# Patient Record
Sex: MALE | Race: WHITE | NOT HISPANIC OR LATINO | Employment: OTHER | URBAN - METROPOLITAN AREA
[De-identification: names, ages, dates, MRNs, and addresses within clinical notes are randomized per-mention and may not be internally consistent; named-entity substitution may affect disease eponyms.]

---

## 2018-07-13 ENCOUNTER — OFFICE VISIT (OUTPATIENT)
Dept: OBGYN CLINIC | Facility: CLINIC | Age: 63
End: 2018-07-13
Payer: COMMERCIAL

## 2018-07-13 VITALS
SYSTOLIC BLOOD PRESSURE: 113 MMHG | HEIGHT: 65 IN | BODY MASS INDEX: 28.32 KG/M2 | WEIGHT: 170 LBS | HEART RATE: 73 BPM | DIASTOLIC BLOOD PRESSURE: 68 MMHG

## 2018-07-13 DIAGNOSIS — M75.02 ADHESIVE CAPSULITIS OF LEFT SHOULDER: ICD-10-CM

## 2018-07-13 DIAGNOSIS — M75.112 INCOMPLETE TEAR OF LEFT ROTATOR CUFF: Primary | ICD-10-CM

## 2018-07-13 PROCEDURE — 99204 OFFICE O/P NEW MOD 45 MIN: CPT | Performed by: ORTHOPAEDIC SURGERY

## 2018-07-13 RX ORDER — METFORMIN HYDROCHLORIDE 750 MG/1
750 TABLET, EXTENDED RELEASE ORAL
Refills: 5 | COMMUNITY
Start: 2018-07-08

## 2018-07-13 RX ORDER — LISINOPRIL 10 MG/1
10 TABLET ORAL EVERY EVENING
Refills: 1 | COMMUNITY
Start: 2018-05-12

## 2018-07-13 NOTE — PROGRESS NOTES
Assessment/Plan:  1  Incomplete tear of left rotator cuff  MRI shoulder left wo contrast   2  Adhesive capsulitis of left shoulder  MRI shoulder left wo contrast       Scribe Attestation    I,:   Aggie London am acting as a scribe while in the presence of the attending physician :        I,:   Constantin Price MD personally performed the services described in this documentation    as scribed in my presence :              Monica Tripathi upon examination today demonstrates signs symptoms consistent with a rotator cuff tear  As well as adhesive capsulitis of the left shoulder  Discussed with Monica Tripathi that this is a complicated issue to have  As treatment protocol contradicts shoulder  He did bring an MRI today from 5/17/2016 that demonstrated a high-grade partial-thickness tear of the supraspinatus tendon as well as moderate to severe tenosynovitis of the biceps tendon  That is likely torn as well  Monica Tripathi has failed conservative measures such as physical therapy, PRP injection, as well as a chlorhexidine cleansing  I discussed with Monica Tripathi that would like to have a more current MRI of his left shoulder to determine whether not his rotator cuff tear has progressed  I provided a prescription for this today  I discussed with him at length surgical options for Monica Tripathi as it is complicated of a left shoulder manipulation under anesthesia, with arthroscopy to repair his rotator cuff as well as a possible biceps tenodesis  Additionally I discussed deep recovery  For this procedure as we have to be very cautious to move Monica Tripathi enough to prevent recurrent adhesive capsulitis however cannot move enough to injure the rotator cuff repair  Monica Tripathi understands the risks of the procedure including but not limited to bleeding, infection, recurrent injury, recurrent symptoms, loss of strength, loss range of motion, failure surgery, need for further surgery, and nerve injury    I will see Monica Tripathi back when they have the MRI completed  Subjective:   Trudy Oquendo is a 60 y o  male who presents with left shoulder pain and stiffness  This has been ongoing issue for over 2 years  He states that he denies any trauma to the shoulder  However 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 [employer]  He states that pain is exacerbated with any motion of the shoulder away from the body  The and is better at rest   He has been previously treated with a PRP injection the into the shoulder on 8/22/2016  He states that this did provide him a great deal relief however pain and restriction range of motion did come back after a few months  In mid June 2018 he was treated with a chlorhexidine cleanse for his adhesive capsulitis  He states that this was beneficial as well  However pain and stiffness returned in 2-3 days he was prescribed physical therapy as well upon procedure and that has failed to improve his symptoms  Today he denies any distal paresthesias  However does note a resting tremor  That appears to be a essential tremor  Review of Systems   Constitutional: Negative  HENT: Negative  Eyes: Negative  Respiratory: Negative  Cardiovascular: Negative  Gastrointestinal: Negative  Endocrine: Negative  Genitourinary: Negative  Musculoskeletal: Positive for arthralgias and myalgias  Skin: Negative  Allergic/Immunologic: Negative  Neurological: Positive for tremors  Hematological: Negative  Psychiatric/Behavioral: Negative  Nervous/anxious:           Past Medical History:   Diagnosis Date    Diabetes ([address] )     Hypertension        Past Surgical History:   Procedure Laterality Date    CYST REMOVAL      UNDESCENDED TESTICLE EXPLORATION         No family history on file  Social History     Occupational History    Not on file       Social History Main Topics    Smoking status: Never Smoker    Smokeless tobacco: Never Used    Alcohol use Yes      Comment: rarely    Drug use: No    Sexual activity: Not on file         Current Outpatient Prescriptions:     diclofenac sodium (VOLTAREN) 1 %, , Disp: , Rfl: 0    glucose blood test strip, 1 strip by Nasogastric route, Disp: , Rfl:     lisinopril (ZESTRIL) 10 mg tablet, , Disp: , Rfl: 1    metFORMIN (GLUCOPHAGE-XR) 750 mg 24 hr tablet, , Disp: , Rfl: 5    ONE TOUCH ULTRA TEST test strip, , Disp: , Rfl: 11    Allergies   Allergen Reactions    Other      Cats       Objective:  Vitals:    07/13/18 0937   BP: 113/68   Pulse: 73       Left Shoulder Exam     Tenderness   The patient is experiencing no tenderness  Range of Motion   Left shoulder active abduction: 35    Passive Abduction: 70   Left shoulder forward flexion: 35    External Rotation: 0   Internal Rotation 0 degrees: Sacrum   Internal Rotation 90 degrees: 0     Muscle Strength   Abduction: 3/5   Internal Rotation: 5/5   External Rotation: 4/5   Supraspinatus: 3/5   Subscapularis: 5/5     Tests   Hawkin's test: positive  Impingement: positive    Other   Erythema: absent  Scars: absent  Sensation: normal  Pulse: present     Comments:  Empty can (+)    Sensation is intact    Demonstrates a essential tremor at rest            Physical Exam   Constitutional: He is oriented to person, place, and time  He appears well-developed and well-nourished  HENT:   Head: Normocephalic and atraumatic  Eyes: Conjunctivae are normal    Neck: Normal range of motion  Cardiovascular: Normal rate  Pulmonary/Chest: Effort normal    Musculoskeletal:   As noted in HPI   Neurological: He is alert and oriented to person, place, and time  Skin: Skin is warm and dry  Psychiatric: He has a normal mood and affect  His behavior is normal  Judgment and thought content normal        I have personally reviewed pertinent films in PACS and my interpretation is as follows:    MRI of the left shoulder from 5/17/2016 demonstrates a high-grade partial tear of the supraspinatus    As well as a bone spur at the inferior aspect the humeral head   Moderate severe tenosynovitis of the biceps long head tendon noted    X-rays of the left shoulder from 4/30/2016 demonstrates mild-to-moderate osteoarthritis the glenohumeral joint

## 2018-07-24 ENCOUNTER — OFFICE VISIT (OUTPATIENT)
Dept: OBGYN CLINIC | Facility: CLINIC | Age: 63
End: 2018-07-24
Payer: COMMERCIAL

## 2018-07-24 VITALS
HEART RATE: 66 BPM | WEIGHT: 172.4 LBS | SYSTOLIC BLOOD PRESSURE: 122 MMHG | DIASTOLIC BLOOD PRESSURE: 75 MMHG | HEIGHT: 65 IN | BODY MASS INDEX: 28.72 KG/M2

## 2018-07-24 DIAGNOSIS — M19.019 SHOULDER ARTHRITIS: Primary | ICD-10-CM

## 2018-07-24 PROCEDURE — 99214 OFFICE O/P EST MOD 30 MIN: CPT | Performed by: ORTHOPAEDIC SURGERY

## 2018-07-24 NOTE — PROGRESS NOTES
Assessment/Plan:  1  Shoulder arthritis         Kartik Peña has severe glenohumeral joint arthritis, and has failed extensive conservative treatment thus far  We have discussed today regarding possible total shoulder replacement versus resurfacing hemiarthroplasty  He is a heavy  but does prefer to have a total shoulder replacement at this point  He states that he can curtail his work so that he does not lift as much postoperatively  He is following the procedure  We discussed the procedure and risks at length, including but not limited to, infection, bleeding, wound issues, nerve injury, blood clot, stiffness, failure of procedure, and need for additional surgery  We will see the patient back at the time of surgery  Subjective:   Trudy Oquendo is a 60 y o  male who presents today for follow-up of his left shoulder  He did have his MRI which showed severe glenohumeral arthritis as well as partial undersurface tearing of supraspinatus and interstitial tearing of subscapularis, which are unchanged compared to prior MRI  Biceps tendinosis was also noted without evidence of tear  The patient notes ongoing diffuse pain about the shoulder which can radiate down the arm some at times  This is worse with really any attempts at movement and slightly better with rest   He notes significant limitations in range of motion of the shoulder  He denies any paresthesias of the upper extremity  Review of Systems   Constitutional: Negative for chills, fever and unexpected weight change  HENT: Negative for hearing loss, nosebleeds and sore throat  Eyes: Negative for pain, redness and visual disturbance  Respiratory: Negative for cough, shortness of breath and wheezing  Cardiovascular: Negative for chest pain, palpitations and leg swelling  Gastrointestinal: Negative for abdominal pain, nausea and vomiting  Endocrine: Negative for polyphagia and polyuria  Genitourinary: Negative for dysuria and hematuria  Musculoskeletal:        See HPI   Skin: Negative for rash and wound  Neurological: Negative for dizziness, numbness and headaches  Psychiatric/Behavioral: Negative for decreased concentration and suicidal ideas  The patient is not nervous/anxious  Past Medical History:   Diagnosis Date    Diabetes ([address] )     Hypertension        Past Surgical History:   Procedure Laterality Date    CYST REMOVAL      UNDESCENDED TESTICLE EXPLORATION         History reviewed  No pertinent family history  Social History     Occupational History    Not on file  Social History Main Topics    Smoking status: Never Smoker    Smokeless tobacco: Never Used    Alcohol use Yes      Comment: rarely    Drug use: No    Sexual activity: Not on file         Current Outpatient Prescriptions:     diclofenac sodium (VOLTAREN) 1 %, , Disp: , Rfl: 0    glucose blood test strip, 1 strip by Nasogastric route, Disp: , Rfl:     lisinopril (ZESTRIL) 10 mg tablet, , Disp: , Rfl: 1    metFORMIN (GLUCOPHAGE-XR) 750 mg 24 hr tablet, , Disp: , Rfl: 5    ONE TOUCH ULTRA TEST test strip, , Disp: , Rfl: 11    Allergies   Allergen Reactions    Other      Cats       Objective:  Vitals:    07/24/18 0809   BP: 122/75   Pulse: 66       Left Shoulder Exam     Tenderness   The patient is experiencing no tenderness  Range of Motion   Active Abduction: 20   Forward Flexion: 20   External Rotation: 0   Left shoulder internal rotation 0 degrees: left buttock  Muscle Strength   Internal Rotation: 5/5   External Rotation: 5/5     Other   Sensation: normal  Pulse: present             Physical Exam   Constitutional: He is oriented to person, place, and time  He appears well-developed  HENT:   Head: Normocephalic and atraumatic  Eyes: Conjunctivae are normal    Neck: Neck supple  Cardiovascular: Normal heart sounds and intact distal pulses  Pulmonary/Chest: Effort normal and breath sounds normal    Abdominal: Soft  Neurological: He is alert and oriented to person, place, and time  Skin: Skin is warm and dry  Psychiatric: He has a normal mood and affect  His behavior is normal    Vitals reviewed  I have personally reviewed pertinent films in PACS and my interpretation is as follows:  MRI left shoulder; severe glenohumeral joint arthritis with cystic changes of the humeral head and large inferior osteophyte of the humeral head  Partial undersurface tear supraspinatus and partial interstitial tearing subscapularis, both which are unchanged compared to prior MRI

## 2018-07-31 PROBLEM — M75.112 INCOMPLETE TEAR OF LEFT ROTATOR CUFF: Status: ACTIVE | Noted: 2018-07-31

## 2018-07-31 PROBLEM — M19.019 SHOULDER ARTHRITIS: Status: ACTIVE | Noted: 2018-07-31

## 2018-08-08 ENCOUNTER — TELEPHONE (OUTPATIENT)
Dept: OBGYN CLINIC | Facility: HOSPITAL | Age: 63
End: 2018-08-08

## 2018-08-08 NOTE — TELEPHONE ENCOUNTER
Per conversation with Michelle Davies, I called patient to advise her that a letter would not be accepted due to the rise in forged medical letters  Advised that the patient wear his sling to the airport

## 2018-08-08 NOTE — TELEPHONE ENCOUNTER
Patient sees Dr Ava Ocampo   [phone]      Anahi Seo, patient's wife is asking if there is any way she could  a letter that states that the patient cannot raise his left arm above his head  They are traveling tomorrow & Anahi Seo is worried that it might be an issue     Please let Anahi Seo know when the letter is ready so she can pick it up or if there is a problem taking care of this request

## 2018-08-14 ENCOUNTER — OFFICE VISIT (OUTPATIENT)
Dept: OBGYN CLINIC | Facility: CLINIC | Age: 63
End: 2018-08-14
Payer: COMMERCIAL

## 2018-08-14 VITALS
SYSTOLIC BLOOD PRESSURE: 131 MMHG | WEIGHT: 174.4 LBS | HEART RATE: 79 BPM | BODY MASS INDEX: 29.06 KG/M2 | DIASTOLIC BLOOD PRESSURE: 74 MMHG | HEIGHT: 65 IN

## 2018-08-14 DIAGNOSIS — M70.22 OLECRANON BURSITIS, LEFT ELBOW: Primary | ICD-10-CM

## 2018-08-14 PROCEDURE — 99213 OFFICE O/P EST LOW 20 MIN: CPT | Performed by: ORTHOPAEDIC SURGERY

## 2018-08-14 NOTE — PROGRESS NOTES
Assessment/Plan:  1  Olecranon bursitis, left elbow         Scribe Attestation    I,:   Ysabel Powersmatcarla am acting as a scribe while in the presence of the attending physician :        I,:   Libby Trejo MD personally performed the services described in this documentation    as scribed in my presence :            Carly Beltran has signs and symptoms consistent with olecranon bursitis of his left elbow  I do not appreciate any signs of infection  I believe the swelling in his wrist and hand is due to dependent edema caused by his olecranon bursitis  I explained this to the patient and we discussed signs and symptoms of infection including redness, warmth, fever and chills  I instructed Carly Beltran to follow up with me immediately should he experience any of these issues  I also explained that this injury is quite benign and should resolve on its own  I did caution him to not allow anyone to inject his elbow or attempt to aspirate the swelling as this can cause infection  Carly Beltran can follow up with me as needed for this  Subjective:   Kassidy Carrasco is a 60 y o  male who presents today for evaluation of left elbow, wrist and hand swelling that he first noticed on 8/12/18  He denies pain in his elbow but complains of tightness in his left elbow, wrist and hand, especially with end range elbow flexion and end range gripping  He states that his wife first noticed a bump on the back of his left elbow on 8/12/18 and also noticed that it was warm to the touch  He does not remember an acute injury or trauma but it is possible due to the nature of his work  He denies any paresthesia  He denies any fever or chills  Review of Systems   Constitutional: Negative for chills, fever and unexpected weight change  HENT: Negative for hearing loss, nosebleeds and sore throat  Eyes: Negative for pain, redness and visual disturbance  Respiratory: Negative for cough, shortness of breath and wheezing      Cardiovascular: Negative for chest pain, palpitations and leg swelling  Gastrointestinal: Negative for abdominal pain, nausea and vomiting  Endocrine: Negative for polyphagia and polyuria  Genitourinary: Negative for dysuria and hematuria  Musculoskeletal: Positive for joint swelling  Negative for arthralgias and myalgias  See HPI   Skin: Negative for rash and wound  Neurological: Negative for dizziness, numbness and headaches  Psychiatric/Behavioral: Negative for decreased concentration and suicidal ideas  The patient is not nervous/anxious  Past Medical History:   Diagnosis Date    [address] ([facility] [address]     Hypertension        Past Surgical History:   Procedure Laterality Date    CYST REMOVAL      UNDESCENDED TESTICLE EXPLORATION         History reviewed  No pertinent family history  Social History     Occupational History    Not on file  Social History Main Topics    Smoking status: Never Smoker    Smokeless tobacco: Never Used    Alcohol use Yes      Comment: rarely    Drug use: No    Sexual activity: Not on file         Current Outpatient Prescriptions:     diclofenac sodium (VOLTAREN) 1 %, , Disp: , Rfl: 0    lisinopril (ZESTRIL) 10 mg tablet, , Disp: , Rfl: 1    metFORMIN (GLUCOPHAGE-XR) 750 mg 24 hr tablet, , Disp: , Rfl: 5    ONE TOUCH ULTRA TEST test strip, , Disp: , Rfl: 11    Allergies   Allergen Reactions    Other      Cats       Objective:  Vitals:    08/14/18 1138   BP: 131/74   Pulse: 79       Left Elbow Exam     Tenderness   The patient is experiencing no tenderness  Range of Motion   Extension: 10   Flexion: 130     Tests Varus: negative  Valgus: negative        Other   Erythema: absent  Scars: absent  Sensation: normal  Pulse: present    Comments:  Obvious swelling of the olecranon bursa            Physical Exam   Constitutional: He is oriented to person, place, and time  He appears well-developed  HENT:   Head: Normocephalic and atraumatic     Eyes: Conjunctivae are normal  Neck: Neck supple  Cardiovascular: Intact distal pulses  Pulmonary/Chest: Effort normal    Abdominal: Soft  Neurological: He is alert and oriented to person, place, and time  Skin: Skin is warm and dry  Psychiatric: He has a normal mood and affect  His behavior is normal    Vitals reviewed

## 2018-08-15 DIAGNOSIS — M75.02 ADHESIVE CAPSULITIS OF LEFT SHOULDER: ICD-10-CM

## 2018-08-15 DIAGNOSIS — M75.112 INCOMPLETE TEAR OF LEFT ROTATOR CUFF: Primary | ICD-10-CM

## 2018-08-29 ENCOUNTER — TELEPHONE (OUTPATIENT)
Dept: OBGYN CLINIC | Facility: CLINIC | Age: 63
End: 2018-08-29

## 2018-08-31 ENCOUNTER — TRANSCRIBE ORDERS (OUTPATIENT)
Dept: ADMINISTRATIVE | Facility: HOSPITAL | Age: 63
End: 2018-08-31

## 2018-08-31 ENCOUNTER — APPOINTMENT (OUTPATIENT)
Dept: LAB | Facility: HOSPITAL | Age: 63
DRG: 484 | End: 2018-08-31
Attending: ORTHOPAEDIC SURGERY
Payer: COMMERCIAL

## 2018-08-31 ENCOUNTER — APPOINTMENT (OUTPATIENT)
Dept: PREADMISSION TESTING | Facility: HOSPITAL | Age: 63
DRG: 484 | End: 2018-08-31
Payer: COMMERCIAL

## 2018-08-31 DIAGNOSIS — Z01.818 PREOP TESTING: Primary | ICD-10-CM

## 2018-08-31 DIAGNOSIS — Z01.818 PREOP TESTING: ICD-10-CM

## 2018-08-31 LAB
ABO GROUP BLD: NORMAL
BLD GP AB SCN SERPL QL: NEGATIVE
RH BLD: POSITIVE
SPECIMEN EXPIRATION DATE: NORMAL

## 2018-08-31 PROCEDURE — 36415 COLL VENOUS BLD VENIPUNCTURE: CPT

## 2018-08-31 PROCEDURE — 86850 RBC ANTIBODY SCREEN: CPT

## 2018-08-31 PROCEDURE — 86901 BLOOD TYPING SEROLOGIC RH(D): CPT

## 2018-08-31 PROCEDURE — 86900 BLOOD TYPING SEROLOGIC ABO: CPT

## 2018-08-31 PROCEDURE — 87081 CULTURE SCREEN ONLY: CPT

## 2018-08-31 NOTE — PRE-PROCEDURE INSTRUCTIONS
My Surgical Experience    The following information was developed to assist you to prepare for your operation  What do I need to do before coming to the hospital?   Arrange for a responsible person to drive you to and from the hospital    Arrange care for your children at home  Children are not allowed in the recovery areas of the hospital   Plan to wear clothing that is easy to put on and take off  If you are having shoulder surgery, wear a shirt that buttons or zippers in the front  Bathing  o Shower the evening before and the morning of your surgery with an antibacterial soap  Please refer to the Pre Op Showering Instructions for Surgery Patients Sheet   o Remove nail polish and all body piercing jewelry  o Do not shave any body part for at least 24 hours before surgery-this includes face, arms, legs and upper body  Food  o Nothing to eat or drink after midnight the night before your surgery  This includes candy and chewing gum  o Exception: If your surgery is after 12:00pm (noon), you may have clear liquids such as 7-Up®, ginger ale, apple or cranberry juice, Jell-O®, water, or clear broth until 8:00 am  o Do not drink milk or juice with pulp on the morning before surgery  o Do not drink alcohol 24 hours before surgery  Medicine  o Follow instructions you received from your surgeon about which medicines you may take on the day of surgery  o If instructed to take medicine on the morning of surgery, take pills with just a small sip of water  Call your prescribing doctor for specific infroamtion on what to do if you take insulin    What should I bring to the hospital?    Bring:  Aarti Martin or a walker, if you have them, for foot or knee surgery   A list of the daily medicines, vitamins, minerals, herbals and nutritional supplements you take   Include the dosages of medicines and the time you take them each day   Glasses, dentures or hearing aids   Minimal clothing; you will be wearing hospital sleepwear   Photo ID; required to verify your identity   If you have a Living Will or Power of , bring a copy of the documents   If you have an ostomy, bring an extra pouch and any supplies you use    Do not bring   Medicines or inhalers   Money, valuables or jewelry    What other information should I know about the day of surgery?  Notify your surgeons if you develop a cold, sore throat, cough, fever, rash or any other illness   Report to the Ambulatory Surgical/Same Day Surgery Unit   You will be instructed to stop at Registration only if you have not been pre-registered   Inform your  fi they do not stay that they will be asked by the staff to leave a phone number where they can be reached   Be available to be reached before surgery  In the event the operating room schedule changes, you may be asked to come in earlier or later than expected    *It is important to tell your doctor and others involved in your health care if you are taking or have been taking any non-prescription drugs, vitamins, minerals, herbals or other nutritional supplements  Any of these may interact with some food or medicines and cause a reaction      Pre-Surgery Instructions:   Medication Instructions    diclofenac sodium (VOLTAREN) 1 % Instructed patient per Anesthesia Guidelines   lisinopril (ZESTRIL) 10 mg tablet Instructed patient per Anesthesia Guidelines   metFORMIN (GLUCOPHAGE-XR) 750 mg 24 hr tablet Instructed patient per Anesthesia Guidelines   ONE TOUCH ULTRA TEST test strip Instructed patient per Anesthesia Guidelines

## 2018-09-01 LAB — MRSA NOSE QL CULT: NORMAL

## 2018-09-06 ENCOUNTER — ANESTHESIA EVENT (OUTPATIENT)
Dept: PERIOP | Facility: HOSPITAL | Age: 63
DRG: 484 | End: 2018-09-06
Payer: COMMERCIAL

## 2018-09-06 ENCOUNTER — APPOINTMENT (INPATIENT)
Dept: RADIOLOGY | Facility: HOSPITAL | Age: 63
DRG: 484 | End: 2018-09-06
Payer: COMMERCIAL

## 2018-09-06 ENCOUNTER — ANESTHESIA (OUTPATIENT)
Dept: PERIOP | Facility: HOSPITAL | Age: 63
DRG: 484 | End: 2018-09-06
Payer: COMMERCIAL

## 2018-09-06 ENCOUNTER — HOSPITAL ENCOUNTER (INPATIENT)
Facility: HOSPITAL | Age: 63
LOS: 1 days | Discharge: HOME | DRG: 484 | End: 2018-09-07
Attending: ORTHOPAEDIC SURGERY | Admitting: ORTHOPAEDIC SURGERY
Payer: COMMERCIAL

## 2018-09-06 LAB
ABO GROUP BLD: NORMAL
GLUCOSE SERPL-MCNC: 130 MG/DL (ref 65–140)
RH BLD: POSITIVE

## 2018-09-06 PROCEDURE — 23472 RECONSTRUCT SHOULDER JOINT: CPT | Performed by: ORTHOPAEDIC SURGERY

## 2018-09-06 PROCEDURE — C1776 JOINT DEVICE (IMPLANTABLE): HCPCS | Performed by: ORTHOPAEDIC SURGERY

## 2018-09-06 PROCEDURE — C1713 ANCHOR/SCREW BN/BN,TIS/BN: HCPCS | Performed by: ORTHOPAEDIC SURGERY

## 2018-09-06 PROCEDURE — 23472 RECONSTRUCT SHOULDER JOINT: CPT | Performed by: PHYSICIAN ASSISTANT

## 2018-09-06 PROCEDURE — 0LS40ZZ REPOSITION LEFT UPPER ARM TENDON, OPEN APPROACH: ICD-10-PCS | Performed by: ORTHOPAEDIC SURGERY

## 2018-09-06 PROCEDURE — 82948 REAGENT STRIP/BLOOD GLUCOSE: CPT

## 2018-09-06 PROCEDURE — 73020 X-RAY EXAM OF SHOULDER: CPT

## 2018-09-06 PROCEDURE — 23430 REPAIR BICEPS TENDON: CPT | Performed by: PHYSICIAN ASSISTANT

## 2018-09-06 PROCEDURE — 86900 BLOOD TYPING SEROLOGIC ABO: CPT | Performed by: ORTHOPAEDIC SURGERY

## 2018-09-06 PROCEDURE — 86901 BLOOD TYPING SEROLOGIC RH(D): CPT | Performed by: ORTHOPAEDIC SURGERY

## 2018-09-06 PROCEDURE — 23430 REPAIR BICEPS TENDON: CPT | Performed by: ORTHOPAEDIC SURGERY

## 2018-09-06 PROCEDURE — 0RRK0JZ REPLACEMENT OF LEFT SHOULDER JOINT WITH SYNTHETIC SUBSTITUTE, OPEN APPROACH: ICD-10-PCS | Performed by: ORTHOPAEDIC SURGERY

## 2018-09-06 DEVICE — VERSABOND AB 40 GRAMS FORMULATION 2
Type: IMPLANTABLE DEVICE | Status: FUNCTIONAL
Brand: VERSABOND

## 2018-09-06 DEVICE — GLOBAL ANCHOR PEG GLENOID PREMIERON X-LINKED PE SIZE 44MM
Type: IMPLANTABLE DEVICE | Status: FUNCTIONAL
Brand: GLOBAL

## 2018-09-06 DEVICE — GLOBAL UNITE STANDARD HUMERAL HEAD SIZE 48MM X 18MM
Type: IMPLANTABLE DEVICE | Status: FUNCTIONAL
Brand: GLOBAL UNITE

## 2018-09-06 DEVICE — GLOBAL UNITE ANATOMIC PROXIMAL BODY 135 DEGREE SIZE 14 POROCOAT
Type: IMPLANTABLE DEVICE | Status: FUNCTIONAL
Brand: GLOBAL UNITE

## 2018-09-06 DEVICE — GLOBAL UNITE POROCOAT STANDARD STEM SIZE 14 129MM
Type: IMPLANTABLE DEVICE | Status: FUNCTIONAL
Brand: GLOBAL UNITE

## 2018-09-06 RX ORDER — ASPIRIN 325 MG
325 TABLET ORAL DAILY
Status: DISCONTINUED | OUTPATIENT
Start: 2018-09-07 | End: 2018-09-07 | Stop reason: HOSPADM

## 2018-09-06 RX ORDER — ONDANSETRON 2 MG/ML
4 INJECTION INTRAMUSCULAR; INTRAVENOUS ONCE AS NEEDED
Status: DISCONTINUED | OUTPATIENT
Start: 2018-09-06 | End: 2018-09-06 | Stop reason: HOSPADM

## 2018-09-06 RX ORDER — LABETALOL HYDROCHLORIDE 5 MG/ML
10 INJECTION, SOLUTION INTRAVENOUS AS NEEDED
Status: DISCONTINUED | OUTPATIENT
Start: 2018-09-06 | End: 2018-09-06 | Stop reason: HOSPADM

## 2018-09-06 RX ORDER — ACETAMINOPHEN 325 MG/1
650 TABLET ORAL EVERY 6 HOURS PRN
Status: DISCONTINUED | OUTPATIENT
Start: 2018-09-06 | End: 2018-09-07 | Stop reason: HOSPADM

## 2018-09-06 RX ORDER — PROMETHAZINE HYDROCHLORIDE 25 MG/ML
12.5 INJECTION, SOLUTION INTRAMUSCULAR; INTRAVENOUS ONCE AS NEEDED
Status: DISCONTINUED | OUTPATIENT
Start: 2018-09-06 | End: 2018-09-06 | Stop reason: HOSPADM

## 2018-09-06 RX ORDER — MEPERIDINE HYDROCHLORIDE 25 MG/ML
12.5 INJECTION INTRAMUSCULAR; INTRAVENOUS; SUBCUTANEOUS
Status: DISCONTINUED | OUTPATIENT
Start: 2018-09-06 | End: 2018-09-06 | Stop reason: HOSPADM

## 2018-09-06 RX ORDER — FENTANYL CITRATE 50 UG/ML
INJECTION, SOLUTION INTRAMUSCULAR; INTRAVENOUS AS NEEDED
Status: DISCONTINUED | OUTPATIENT
Start: 2018-09-06 | End: 2018-09-06 | Stop reason: SURG

## 2018-09-06 RX ORDER — SODIUM CHLORIDE, SODIUM LACTATE, POTASSIUM CHLORIDE, CALCIUM CHLORIDE 600; 310; 30; 20 MG/100ML; MG/100ML; MG/100ML; MG/100ML
75 INJECTION, SOLUTION INTRAVENOUS CONTINUOUS
Status: DISCONTINUED | OUTPATIENT
Start: 2018-09-06 | End: 2018-09-06

## 2018-09-06 RX ORDER — FENTANYL CITRATE/PF 50 MCG/ML
50 SYRINGE (ML) INJECTION
Status: DISCONTINUED | OUTPATIENT
Start: 2018-09-06 | End: 2018-09-06 | Stop reason: HOSPADM

## 2018-09-06 RX ORDER — PROPOFOL 10 MG/ML
INJECTION, EMULSION INTRAVENOUS AS NEEDED
Status: DISCONTINUED | OUTPATIENT
Start: 2018-09-06 | End: 2018-09-06 | Stop reason: SURG

## 2018-09-06 RX ORDER — SODIUM CHLORIDE, SODIUM LACTATE, POTASSIUM CHLORIDE, CALCIUM CHLORIDE 600; 310; 30; 20 MG/100ML; MG/100ML; MG/100ML; MG/100ML
125 INJECTION, SOLUTION INTRAVENOUS CONTINUOUS
Status: DISCONTINUED | OUTPATIENT
Start: 2018-09-06 | End: 2018-09-06 | Stop reason: SDUPTHER

## 2018-09-06 RX ORDER — ONDANSETRON 2 MG/ML
4 INJECTION INTRAMUSCULAR; INTRAVENOUS EVERY 6 HOURS PRN
Status: DISCONTINUED | OUTPATIENT
Start: 2018-09-06 | End: 2018-09-07 | Stop reason: HOSPADM

## 2018-09-06 RX ORDER — DOCUSATE SODIUM 100 MG/1
100 CAPSULE, LIQUID FILLED ORAL 2 TIMES DAILY
Status: DISCONTINUED | OUTPATIENT
Start: 2018-09-06 | End: 2018-09-07 | Stop reason: HOSPADM

## 2018-09-06 RX ORDER — METFORMIN HYDROCHLORIDE 500 MG/1
500 TABLET, EXTENDED RELEASE ORAL ONCE
Status: COMPLETED | OUTPATIENT
Start: 2018-09-07 | End: 2018-09-07

## 2018-09-06 RX ORDER — METFORMIN HYDROCHLORIDE 750 MG/1
750 TABLET, EXTENDED RELEASE ORAL
Status: DISCONTINUED | OUTPATIENT
Start: 2018-09-07 | End: 2018-09-06

## 2018-09-06 RX ORDER — LISINOPRIL 10 MG/1
10 TABLET ORAL EVERY EVENING
Status: DISCONTINUED | OUTPATIENT
Start: 2018-09-06 | End: 2018-09-07 | Stop reason: HOSPADM

## 2018-09-06 RX ORDER — OXYCODONE HYDROCHLORIDE AND ACETAMINOPHEN 5; 325 MG/1; MG/1
2 TABLET ORAL EVERY 4 HOURS PRN
Status: DISCONTINUED | OUTPATIENT
Start: 2018-09-06 | End: 2018-09-07

## 2018-09-06 RX ORDER — LIDOCAINE HYDROCHLORIDE 10 MG/ML
INJECTION, SOLUTION INFILTRATION; PERINEURAL AS NEEDED
Status: DISCONTINUED | OUTPATIENT
Start: 2018-09-06 | End: 2018-09-06 | Stop reason: SURG

## 2018-09-06 RX ADMIN — LISINOPRIL 10 MG: 10 TABLET ORAL at 17:33

## 2018-09-06 RX ADMIN — SODIUM CHLORIDE, SODIUM LACTATE, POTASSIUM CHLORIDE, AND CALCIUM CHLORIDE 75 ML/HR: .6; .31; .03; .02 INJECTION, SOLUTION INTRAVENOUS at 17:43

## 2018-09-06 RX ADMIN — CEFAZOLIN SODIUM 2000 MG: 2 SOLUTION INTRAVENOUS at 11:25

## 2018-09-06 RX ADMIN — DOCUSATE SODIUM 100 MG: 100 CAPSULE, LIQUID FILLED ORAL at 17:33

## 2018-09-06 RX ADMIN — LIDOCAINE HYDROCHLORIDE 50 MG: 10 INJECTION, SOLUTION INFILTRATION; PERINEURAL at 11:42

## 2018-09-06 RX ADMIN — SODIUM CHLORIDE, SODIUM LACTATE, POTASSIUM CHLORIDE, AND CALCIUM CHLORIDE 125 ML/HR: .6; .31; .03; .02 INJECTION, SOLUTION INTRAVENOUS at 10:34

## 2018-09-06 RX ADMIN — SODIUM CHLORIDE, SODIUM LACTATE, POTASSIUM CHLORIDE, AND CALCIUM CHLORIDE: .6; .31; .03; .02 INJECTION, SOLUTION INTRAVENOUS at 13:25

## 2018-09-06 RX ADMIN — PROPOFOL 200 MG: 10 INJECTION, EMULSION INTRAVENOUS at 11:42

## 2018-09-06 RX ADMIN — CEFAZOLIN SODIUM 2000 MG: 2 SOLUTION INTRAVENOUS at 11:39

## 2018-09-06 RX ADMIN — FENTANYL CITRATE 50 MCG: 50 INJECTION, SOLUTION INTRAMUSCULAR; INTRAVENOUS at 14:15

## 2018-09-06 RX ADMIN — ROPIVACAINE HYDROCHLORIDE: 2 INJECTION, SOLUTION EPIDURAL; INFILTRATION; PERINEURAL at 14:58

## 2018-09-06 RX ADMIN — FENTANYL CITRATE 50 MCG: 50 INJECTION, SOLUTION INTRAMUSCULAR; INTRAVENOUS at 12:07

## 2018-09-06 RX ADMIN — CEFAZOLIN SODIUM 1000 MG: 1 SOLUTION INTRAVENOUS at 19:58

## 2018-09-06 NOTE — PLAN OF CARE
DISCHARGE PLANNING - CARE MANAGEMENT     Discharge to post-acute care or home with appropriate resources Progressing        MUSCULOSKELETAL - ADULT     Maintain or return mobility to safest level of function Progressing     Maintain proper alignment of affected body part Progressing        SKIN/TISSUE INTEGRITY - ADULT     Skin integrity remains intact Progressing     Incision(s), wounds(s) or drain site(s) healing without S/S of infection Progressing     Oral mucous membranes remain intact Progressing

## 2018-09-06 NOTE — OP NOTE
OPERATIVE REPORT  PATIENT NAME: Prince Mesa    :  1955  MRN: [id number]  Pt Location: [facility] OR ROOM 03    SURGERY DATE: 2018    Surgeon(s) and Role:     * Virgilio Gan MD - Primary     * Taime De Jesus PA-C - Assisting necessary for the procedure for assistance with retraction of vital structures well as assistance in preparation of the proximal humerus and glenoid as well as assistance in implantation of the total shoulder prosthesis    Preop Diagnosis:  Shoulder arthritis [M19 019] left  Incomplete tear of left rotator cuff [M75 112]    Post-Op Diagnosis Codes: * Shoulder arthritis [M19 019] left     * Incomplete tear of left rotator cuff [M75 112]    Procedure(s) (LRB):  TOTAL SHOULDER ARTHROPLASTY (Left) utilizing Depuy Global unite anatomic proximal body 135 degree size 14 Press-Fit and the standard stem for the Global unite size 14 x 129 mm with a standard humeral head size 48 x 18 mm and the Global [place] peg glenoid cross-linked polyethylene size 44 mm cemented and long head of biceps tenodesis  Specimen(s):  * No specimens in log *    Estimated Blood Loss:   300 mL    Drains:       Anesthesia Type:   General w/ Regional    Operative Indications:  Shoulder arthritis [M19 019]  Incomplete tear of left rotator cuff [M75 112]  Audrey Hdez is a 21-year-old male who has suffered for many years with left shoulder pain and had greatly decreased range of motion and function about his left shoulder  He had great difficulty even trying to lift his arm up for putting on clothes and doing activities of daily living  He and his wife understood the risks and benefits of a left total shoulder replacement as he was found on MRI to have an intact rotator cuff  The risks are inclusive of but not limited to infection, stiffness, nerve injury causing numbness pain and weakness, worsening of symptoms, failure to regain full strength and ability, blood clots, and need for further surgery      Operative Findings:  Left shoulder with severe end-stage grade 4 osteoarthritis on both the glenoid and the humeral head  There were numerous osteophytes noted around the humeral head  Range of motion preoperatively was quite limited to only 70° of forward flexion and 60° of abduction passively with external rotation to 10° and internal rotation is 0°  We did have a stable prosthesis at the end of the procedure however achieving forward flexion and abduction each to 140° with external rotation to the 70° and internal rotation to 60°  It was quite stable  Complications:   None    Procedure and Technique:  Jan Deleon was taken to the operating room and placed supine on the OR table  He was given preoperative IV antibiotics  He was given preoperative regional block by Anesthesia  General anesthesia was induced and he was taking comfortably and safely into the semi beach chair position with all parts well padded and the head in neutral position  We took the left shoulder through exam under anesthesia as described above  The left upper extremity was then prepped and draped in the usual sterile fashion  We took a surgical time-out  We then created a deltopectoral incision with a 15 blade  We did carefully dissect down past subcutaneous adipose to the deltopectoral interval and did take the cephalic vein laterally  We did protect the cephalic vein throughout the procedure  We did dissect down to the subscapularis tendon after we dissected past the conjoined tendon  We did tenotomized this also tagged it with Ethibond suture  We then came down upon the long head of the biceps tendon which had obvious high-grade partial tearing  We did tenodese this utilizing Ethibond suture to the soft tissue in the bicipital tendon sheath in the groove  We then dislocated the left shoulder and demonstrated significant osteophytes along the humeral head circumferentially  We did remove these with a rongeur   We then began our preparation of the proximal humerus  We utilized the starter Reamer and then reamed up sequentially to a size 14 which appeared to fit nicely  This was all done by hand with minimal effort  We then placed our cutting jig proximally with a version christa helping us to determine 30° of retroversion for our proximal humerus cut  We then utilized a sagittal saw for this cut  It should be noted that we protected the axillary nerve and did palpate this early in the case  We then placed a proximal humerus protector and exposed the glenoid  We did circumferentially release the labrum and the stump of the long head of biceps tendon utilizing a Bovie  We were then able to utilize the sizing guides for the glenoid and found a size 44 appeared to be the best fit  We did place our centering pin  We then utilized the Reamer on power and then the eccentric   There were some osteophytes that were removed as well in the glenoid utilizing the rongeur  We felt that we had an excellent visualization and preparation of the glenoid  We then drilled the center hole and utilized the guide to drill the 3 pegs  We then irrigated quite thoroughly and trialed the size 44 glenoid  This fit nicely  We then irrigated and cemented in place in the peripheral holes only the real glenoid  We did place graft into the center hole  We then turned our attention back to the proximal humerus and did broach that appropriately  We did place this and 30° of retroversion  We trialed a size 48 x 18 standard humeral head  We felt that we had very good stability with appropriate Shuck and that we had excellent range of motion as described above  We then removed the trial prosthesis and irrigated thoroughly down the humeral canal   We placed the real humeral stem in 30° of retroversion    We had a good fit that appeared to be very similar to the trial   We then trialed 1 more time the humeral head and felt that a size 48 x 18 standard humeral head was appropriate  We then removed the trial and placed the real humeral head and impacted it to engage the [facility] taper  We did range the shoulder once again and achieved excellent range of motion as described above  We had good stability  We then irrigated once again and closed the subscapularis tenotomy utilizing Ethibond suture  We then closed the deltopectoral interval with #1 Vicryl suture followed by 2-0 Vicryl and 4-0 Vicryl and skin glue  It should be noted that we had a very nice and dry field at the end of the procedure  Dry, sterile dressings were applied with a sling  He tolerated the procedure well and transferred to recovery room stable condition  He will follow up be admitted to the hospital as per standard protocol  He will be on the total shoulder replacement rehabilitation protocol     I was present for the entire procedure and A qualified resident physician was not available    Patient Disposition:  PACU     SIGNATURE: Swetha Dasilva MD  DATE: September 6, 2018  TIME: 1:59 PM

## 2018-09-06 NOTE — ANESTHESIA PREPROCEDURE EVALUATION
Review of Systems/Medical History          Cardiovascular   Pulmonary       GI/Hepatic            Endo/Other  Diabetes ,      GYN       Hematology   Musculoskeletal    Arthritis     Neurology   Psychology                   Review of Systems/Medical History  Patient summary reviewed  Chart reviewed      Cardiovascular  Hypertension controlled,    Pulmonary       GI/Hepatic            Endo/Other  Diabetes poorly controlled type 2 Oral agent,   Comment: Hg A1C 7 8    GYN       Hematology   Musculoskeletal    Arthritis     Neurology   Psychology           Physical Exam    Airway    Mallampati score: I  TM Distance: <3 FB  Neck ROM: full     Dental       Cardiovascular  Rhythm: regular, Rate: normal,     Pulmonary  Breath sounds clear to auscultation,     Other Findings        Anesthesia Plan  ASA Score- 3     Anesthesia Type- general and regional with ASA Monitors  Additional Monitors:   Airway Plan:         Plan Factors-    Induction- intravenous  Postoperative Plan-     Informed Consent- Anesthetic plan and risks discussed with patient and spouse

## 2018-09-06 NOTE — PERIOPERATIVE NURSING NOTE
Left arm essential tremor present on admission, left hand also slightly swollen, good radial pulse, brisk capillary refill  Patient reports Dr Kamille Massey aware of the problem

## 2018-09-06 NOTE — ANESTHESIA PROCEDURE NOTES
Peripheral Block    Patient location during procedure: pre-op  Start time: 9/6/2018 11:00 AM  Reason for block: at surgeon's request and post-op pain management  Staffing  Anesthesiologist: [facility]  Preanesthetic Checklist  Completed: patient identified, site marked, surgical consent, pre-op evaluation, timeout performed, IV checked, risks and benefits discussed and monitors and equipment checked  Peripheral Block  Patient position: supine  Prep: ChloraPrep  Patient monitoring: heart rate, continuous pulse ox and frequent blood pressure checks  Block type: interscalene  Laterality: left  Injection technique: catheter  Procedures: ultrasound guided  Local infiltration: ropivacaine  Infiltration strength: 0 2 %  Dose: 20 mL  Needle  Needle type: Stimuplex   Needle length: 10 cm  Needle localization: ultrasound guidance  Test dose: negative  Assessment  Injection assessment: no paresthesia on injection  Paresthesia pain: none  Heart rate change: no  Post-procedure:  sterile dressing applied  patient tolerated the procedure well with no immediate complications

## 2018-09-06 NOTE — H&P (VIEW-ONLY)
Assessment/Plan:  1  Olecranon bursitis, left elbow         Scribe Attestation    I,:   Jacqueline Delarosa am acting as a scribe while in the presence of the attending physician :        I,:   Jazmyn Adhikari MD personally performed the services described in this documentation    as scribed in my presence :            Jeff Gallo has signs and symptoms consistent with olecranon bursitis of his left elbow  I do not appreciate any signs of infection  I believe the swelling in his wrist and hand is due to dependent edema caused by his olecranon bursitis  I explained this to the patient and we discussed signs and symptoms of infection including redness, warmth, fever and chills  I instructed Jeff Gallo to follow up with me immediately should he experience any of these issues  I also explained that this injury is quite benign and should resolve on its own  I did caution him to not allow anyone to inject his elbow or attempt to aspirate the swelling as this can cause infection  Jeff Gallo can follow up with me as needed for this  Subjective:   Ana Maria Grey is a 60 y o  male who presents today for evaluation of left elbow, wrist and hand swelling that he first noticed on 8/12/18  He denies pain in his elbow but complains of tightness in his left elbow, wrist and hand, especially with end range elbow flexion and end range gripping  He states that his wife first noticed a bump on the back of his left elbow on 8/12/18 and also noticed that it was warm to the touch  He does not remember an acute injury or trauma but it is possible due to the nature of his work  He denies any paresthesia  He denies any fever or chills  Review of Systems   Constitutional: Negative for chills, fever and unexpected weight change  HENT: Negative for hearing loss, nosebleeds and sore throat  Eyes: Negative for pain, redness and visual disturbance  Respiratory: Negative for cough, shortness of breath and wheezing      Cardiovascular: Negative for chest pain, palpitations and leg swelling  Gastrointestinal: Negative for abdominal pain, nausea and vomiting  Endocrine: Negative for polyphagia and polyuria  Genitourinary: Negative for dysuria and hematuria  Musculoskeletal: Positive for joint swelling  Negative for arthralgias and myalgias  See HPI   Skin: Negative for rash and wound  Neurological: Negative for dizziness, numbness and headaches  Psychiatric/Behavioral: Negative for decreased concentration and suicidal ideas  The patient is not nervous/anxious  Past Medical History:   Diagnosis Date    Diabetes ([facility] [address] )     Hypertension        Past Surgical History:   Procedure Laterality Date    CYST REMOVAL      UNDESCENDED TESTICLE EXPLORATION         History reviewed  No pertinent family history  Social History     Occupational History    Not on file  Social History Main Topics    Smoking status: Never Smoker    Smokeless tobacco: Never Used    Alcohol use Yes      Comment: rarely    Drug use: No    Sexual activity: Not on file         Current Outpatient Prescriptions:     diclofenac sodium (VOLTAREN) 1 %, , Disp: , Rfl: 0    lisinopril (ZESTRIL) 10 mg tablet, , Disp: , Rfl: 1    metFORMIN (GLUCOPHAGE-XR) 750 mg 24 hr tablet, , Disp: , Rfl: 5    ONE TOUCH ULTRA TEST test strip, , Disp: , Rfl: 11    Allergies   Allergen Reactions    Other      Cats       Objective:  Vitals:    08/14/18 1138   BP: 131/74   Pulse: 79       Left Elbow Exam     Tenderness   The patient is experiencing no tenderness  Range of Motion   Extension: 10   Flexion: 130     Tests Varus: negative  Valgus: negative        Other   Erythema: absent  Scars: absent  Sensation: normal  Pulse: present    Comments:  Obvious swelling of the olecranon bursa            Physical Exam   Constitutional: He is oriented to person, place, and time  He appears well-developed  HENT:   Head: Normocephalic and atraumatic     Eyes: Conjunctivae are normal  Neck: Neck supple  Cardiovascular: Intact distal pulses  Pulmonary/Chest: Effort normal    Abdominal: Soft  Neurological: He is alert and oriented to person, place, and time  Skin: Skin is warm and dry  Psychiatric: He has a normal mood and affect  His behavior is normal    Vitals reviewed

## 2018-09-06 NOTE — ANESTHESIA POSTPROCEDURE EVALUATION
Post-Op Assessment Note      CV Status:  Stable    Mental Status:  Awake    PONV Controlled:  Controlled    Airway Patency:  Patent and adequate    Post Op Vitals Reviewed: Yes          Staff: CRNA       Comments: arousable    Post-op block assessment: catheter intact and no complications        BP (P) 137/76 (09/06/18 1430)    Temp (P) 97 8 °F (36 6 °C) (09/06/18 1430)    Pulse (P) 85 (09/06/18 1430)   Resp (P) 16 (09/06/18 1430)    SpO2

## 2018-09-07 VITALS
WEIGHT: 170 LBS | BODY MASS INDEX: 28.32 KG/M2 | TEMPERATURE: 98.1 F | OXYGEN SATURATION: 94 % | HEART RATE: 82 BPM | DIASTOLIC BLOOD PRESSURE: 58 MMHG | SYSTOLIC BLOOD PRESSURE: 112 MMHG | RESPIRATION RATE: 18 BRPM | HEIGHT: 65 IN

## 2018-09-07 LAB
ALBUMIN SERPL BCP-MCNC: 3.2 G/DL (ref 3.5–5)
ALP SERPL-CCNC: 58 U/L (ref 46–116)
ALT SERPL W P-5'-P-CCNC: 22 U/L (ref 12–78)
ANION GAP SERPL CALCULATED.3IONS-SCNC: 5 MMOL/L (ref 4–13)
ANION GAP SERPL CALCULATED.3IONS-SCNC: 7 MMOL/L (ref 4–13)
APTT PPP: 26 SECONDS (ref 24–36)
AST SERPL W P-5'-P-CCNC: 17 U/L (ref 5–45)
BILIRUB SERPL-MCNC: 0.7 MG/DL (ref 0.2–1)
BUN SERPL-MCNC: 17 MG/DL (ref 5–25)
BUN SERPL-MCNC: 20 MG/DL (ref 5–25)
CALCIUM SERPL-MCNC: 8.3 MG/DL (ref 8.3–10.1)
CALCIUM SERPL-MCNC: 8.5 MG/DL (ref 8.3–10.1)
CHLORIDE SERPL-SCNC: 101 MMOL/L (ref 100–108)
CHLORIDE SERPL-SCNC: 102 MMOL/L (ref 100–108)
CO2 SERPL-SCNC: 27 MMOL/L (ref 21–32)
CO2 SERPL-SCNC: 28 MMOL/L (ref 21–32)
CREAT SERPL-MCNC: 0.73 MG/DL (ref 0.6–1.3)
CREAT SERPL-MCNC: 0.84 MG/DL (ref 0.6–1.3)
ERYTHROCYTE [DISTWIDTH] IN BLOOD BY AUTOMATED COUNT: 13 % (ref 11.6–15.1)
GFR SERPL CREATININE-BSD FRML MDRD: 93 ML/MIN/1.73SQ M
GFR SERPL CREATININE-BSD FRML MDRD: 99 ML/MIN/1.73SQ M
GLUCOSE SERPL-MCNC: 175 MG/DL (ref 65–140)
GLUCOSE SERPL-MCNC: 248 MG/DL (ref 65–140)
HCT VFR BLD AUTO: 33.5 % (ref 36.5–49.3)
HGB BLD-MCNC: 11 G/DL (ref 12–17)
INR PPP: 1.08 (ref 0.86–1.16)
MCH RBC QN AUTO: 31 PG (ref 26.8–34.3)
MCHC RBC AUTO-ENTMCNC: 32.8 G/DL (ref 31.4–37.4)
MCV RBC AUTO: 94 FL (ref 82–98)
PLATELET # BLD AUTO: 208 THOUSANDS/UL (ref 149–390)
PMV BLD AUTO: 10.1 FL (ref 8.9–12.7)
POTASSIUM SERPL-SCNC: 4.1 MMOL/L (ref 3.5–5.3)
POTASSIUM SERPL-SCNC: 4.3 MMOL/L (ref 3.5–5.3)
PROT SERPL-MCNC: 6.3 G/DL (ref 6.4–8.2)
PROTHROMBIN TIME: 11.3 SECONDS (ref 9.4–11.7)
RBC # BLD AUTO: 3.55 MILLION/UL (ref 3.88–5.62)
SODIUM SERPL-SCNC: 135 MMOL/L (ref 136–145)
SODIUM SERPL-SCNC: 135 MMOL/L (ref 136–145)
WBC # BLD AUTO: 9.71 THOUSAND/UL (ref 4.31–10.16)

## 2018-09-07 PROCEDURE — 80048 BASIC METABOLIC PNL TOTAL CA: CPT | Performed by: PHYSICIAN ASSISTANT

## 2018-09-07 PROCEDURE — G8988 SELF CARE GOAL STATUS: HCPCS

## 2018-09-07 PROCEDURE — 97167 OT EVAL HIGH COMPLEX 60 MIN: CPT

## 2018-09-07 PROCEDURE — 80053 COMPREHEN METABOLIC PANEL: CPT | Performed by: PHYSICIAN ASSISTANT

## 2018-09-07 PROCEDURE — 85730 THROMBOPLASTIN TIME PARTIAL: CPT | Performed by: PHYSICIAN ASSISTANT

## 2018-09-07 PROCEDURE — 85610 PROTHROMBIN TIME: CPT | Performed by: PHYSICIAN ASSISTANT

## 2018-09-07 PROCEDURE — 97110 THERAPEUTIC EXERCISES: CPT

## 2018-09-07 PROCEDURE — G8987 SELF CARE CURRENT STATUS: HCPCS

## 2018-09-07 PROCEDURE — 99024 POSTOP FOLLOW-UP VISIT: CPT | Performed by: PHYSICIAN ASSISTANT

## 2018-09-07 PROCEDURE — 85027 COMPLETE CBC AUTOMATED: CPT | Performed by: PHYSICIAN ASSISTANT

## 2018-09-07 PROCEDURE — 97535 SELF CARE MNGMENT TRAINING: CPT

## 2018-09-07 RX ORDER — OXYCODONE HYDROCHLORIDE AND ACETAMINOPHEN 5; 325 MG/1; MG/1
1 TABLET ORAL EVERY 4 HOURS PRN
Status: DISCONTINUED | OUTPATIENT
Start: 2018-09-07 | End: 2018-09-07 | Stop reason: HOSPADM

## 2018-09-07 RX ADMIN — ASPIRIN 325 MG: 325 TABLET ORAL at 09:00

## 2018-09-07 RX ADMIN — METFORMIN HYDROCHLORIDE 500 MG: 500 TABLET, EXTENDED RELEASE ORAL at 09:00

## 2018-09-07 RX ADMIN — CEFAZOLIN SODIUM 1000 MG: 1 SOLUTION INTRAVENOUS at 03:39

## 2018-09-07 RX ADMIN — ACETAMINOPHEN 650 MG: 325 TABLET, FILM COATED ORAL at 06:45

## 2018-09-07 RX ADMIN — OXYCODONE HYDROCHLORIDE AND ACETAMINOPHEN 1 TABLET: 5; 325 TABLET ORAL at 09:01

## 2018-09-07 RX ADMIN — DOCUSATE SODIUM 100 MG: 100 CAPSULE, LIQUID FILLED ORAL at 09:00

## 2018-09-07 NOTE — SOCIAL WORK
DASH discussion completed  Discussed goals of making sure pt's needs are met upon discharge, pt's preferences are taken into account, pt understands her health condition, medications and symptoms to watch for after returning home and pt is aware of any follow up appointments recommended by hospital physician  CM spoke with the pt at the bedside  Pt lives with his wife and is independent with his own care  Pt has a shower chair at home but no other DME or needs  Per pt wife, he will be following up with Ortho for further treatment as OP  No DCP needs noted

## 2018-09-07 NOTE — PROGRESS NOTES
Progress Note - Acute Pain Service    Robert Vernon 60 y o  male MRN: [id number]  Unit/Bed#: [address] [place] [address] Encounter: [id number]      Assessment:   60year old male POD1 from left total shoulder replacement with left interscalene catheter in place  He had some minor discomfort last night with need of 1 percocet  Pain is rated 2/10  Plan:   Continue peripheral catheter and oral pain medication  Patient briefed on how to remove the catheter and how to contact anesthesia in the event of any issues  Meds/Allergies   all current active meds have been reviewed    Allergies   Allergen Reactions    Other      Cats       Objective     Temp:  [97 8 °F (36 6 °C)-98 7 °F (37 1 °C)] 98 °F (36 7 °C)  HR:  [78-92] 78  Resp:  [16-20] 20  BP: (107-150)/(56-82) 107/56    Physical Exam   Skin:   Left shoulder dressing is clean, dry, and intact  Pump set to 8 ml/hr  Lab Results: I have personally reviewed pertinent labs  Imaging Studies: I have personally reviewed pertinent reports

## 2018-09-07 NOTE — NURSING NOTE
PT D/C HOME WITH SPOUSE  AVS AND MEDICATIONS REVIEWED  RX PROVIDED FOR PERCOCET  PT TO FOLLOW UP WITH ORTHO SURGEON  SLING IN PLACE-INSTRUCTION PROVIDED BY PT/OT  PERIPHERAL IV REMOVED AND INTACT  ONQBALL CATHETER INTACT-PT SEEN BY ANESTHESIA PRIOR TO D/C AND INSTRUCTION PROVIDED ON REMOVAL OF NERVE BLOCK CATHETER  ALL BELONGINGS PRESENT AT TIME OF D/C  PT TAKEN TO LOBBY BY NA VIA WHEELCHAIR

## 2018-09-07 NOTE — PROGRESS NOTES
Progress Note - Orthopedics   Kathernie Amor 60 y o  male MRN: [id number]  Unit/Bed#: 2 [place] [address] Encounter: [id number]      Subjective: Status post left total shoulder replacement performed yesterday  Patient feels well  Notes 2/10 pain  He did not sleep well last night, but due to venodynes, not pain  He notes good sensation of the upper extremity  Post-op xrays showed appropriate alignment of prosthesis with no fracture  Vitals: Blood pressure 107/56, pulse 78, temperature 98 °F (36 7 °C), temperature source Oral, resp  rate 20, height 5' 5" (1 651 m), weight 77 1 kg (170 lb), SpO2 92 %  ,Body mass index is 28 29 kg/m²  Intake/Output Summary (Last 24 hours) at 09/07/18 1305  Last data filed at 09/06/18 1400   Gross per 24 hour   Intake              400 ml   Output                0 ml   Net              400 ml       Invasive Devices     Epidural Line            Nerve Block Catheter 09/06/18 1 day                Ortho Exam: Alert and oriented X 3  Dressing CDI  Mild swelling shoulder  Sensation intact axillay, median, ulnar, and radial nerve distributions on operative extremity  5/5 strength EPL, FPL, APB, and first dorsal interosseous of operative extremity  2+radial pulse  Lab, Imaging and other studies: I have personally reviewed pertinent lab results  Post-operative xrays showed hardware intact with no fracture    CBC:   Lab Results   Component Value Date    WBC 9 71 09/07/2018    HGB 11 0 (L) 09/07/2018    HCT 33 5 (L) 09/07/2018    MCV 94 09/07/2018     09/07/2018    MCH 31 0 09/07/2018    MCHC 32 8 09/07/2018    RDW 13 0 09/07/2018    MPV 10 1 09/07/2018     CMP: Lab Results   Component Value Date     (L) 09/07/2018     09/07/2018    CL 96 08/20/2018    CO2 28 09/07/2018    CO2 22 08/20/2018    BUN 17 09/07/2018    BUN 26 08/20/2018    CREATININE 0 73 09/07/2018    CALCIUM 8 3 09/07/2018    AST 17 09/07/2018    ALT 22 09/07/2018    ALKPHOS 58 09/07/2018    EGFR 99 09/07/2018 PT/INR:   Lab Results   Component Value Date    INR 1 08 09/07/2018                 Assessment:  Doing well-status post total shoulder replacement    Plan:  Discharge to home  Please see discharge instructions for details  F/U 1 week

## 2018-09-07 NOTE — CASE MANAGEMENT
Initial Clinical Review    Age/Sex: 60 y o  male    Surgery Date: 9/6/18     Procedure: Procedure(s) (LRB):  TOTAL SHOULDER ARTHROPLASTY (Left) utilizing Depuy Global unite anatomic proximal body 135 degree size 14 Press-Fit and the standard stem for the Global unite size 14 x 129 mm with a standard humeral head size 48 x 18 mm and the Global [place] peg glenoid cross-linked polyethylene size 44 mm cemented and long head of biceps tenodesis  Operative Findings:  Left shoulder with severe end-stage grade 4 osteoarthritis on both the glenoid and the humeral head  There were numerous osteophytes noted around the humeral head  Range of motion preoperatively was quite limited to only 70° of forward flexion and 60° of abduction passively with external rotation to 10° and internal rotation is 0°  We did have a stable prosthesis at the end of the procedure however achieving forward flexion and abduction each to 140° with external rotation to the 70° and internal rotation to 60°  It was quite stable  Anesthesia: General w/ Regional    Admission Orders: Date/Time/Statement: 9/6/18 @ 2634     Orders Placed This Encounter   Procedures    Inpatient Admission     Standing Status:   Standing     Number of Occurrences:   1     Order Specific Question:   Admitting Physician     Answer:   Oliver Ansari     Order Specific Question:   Level of Care     Answer:   Med Surg [16]     Order Specific Question:   Estimated length of stay     Answer:   More than 2 Midnights     Order Specific Question:   Certification     Answer:   I certify that inpatient services are medically necessary for this patient for a duration of greater than two midnights  See H&P and MD Progress Notes for additional information about the patient's course of treatment         Vital Signs: /56 (BP Location: Right arm)   Pulse 78   Temp 98 °F (36 7 °C) (Oral)   Resp 20   Ht 5' 5" (1 651 m)   Wt 77 1 kg (170 lb)   SpO2 92%   BMI 28 29 kg/m² Diet:        Diet Orders            Start     Ordered    09/06/18 [address]  Room Service  Once     Question:  Type of Service  Answer:  Room Service-Appropriate    09/06/18 1658    09/06/18 1513  Diet Regular; Regular House  Diet effective now     Question Answer Comment   Diet Type Regular    Regular Regular House    RD to adjust diet per protocol?  Yes        09/06/18 1512          Mobility: OUT OF BED ABDUCTION PILLOW SLING     DVT Prophylaxis:  QD    Pain Control:    Scheduled Meds:  Current Facility-Administered Medications:  acetaminophen 650 mg Oral Q6H PRN Thaddeus Escalante PA-C    aspirin 325 mg Oral Daily Thaddeus Escalante PA-C    docusate sodium 100 mg Oral BID Thaddeus Escalante PA-C    lisinopril 10 mg Oral QPM Thaddeus Escalante PA-C    ondansetron 4 mg Intravenous Q6H PRN Thaddeus Escalante PA-C    oxyCODONE-acetaminophen 1 tablet Oral Q4H PRN Thaddeus Escalante PA-C    pneumococcal 23-valent polysaccharide vaccine 0 5 mL Subcutaneous Prior to discharge Leatha Urbano MD    elastomeric infiltration pump builder (ON-Q)  Infiltration Continuous Hollis Granados DO Last Rate: 8 mL/hr at 09/06/18 1458     Continuous Infusions:  elastomeric infiltration pump builder (ON-Q)  Last Rate: 8 mL/hr at 09/06/18 1458     PRN Meds:   acetaminophen    ondansetron    oxyCODONE-acetaminophen    pneumococcal 23-valent polysaccharide vaccine

## 2018-09-07 NOTE — OCCUPATIONAL THERAPY NOTE
Occupational Therapy Evaluation/Treatment     09/07/18 9888   Note Type   Note type Eval/Treat   Restrictions/Precautions   LUE Weight Bearing Per Order NWB   Braces or Orthoses Sling  (abduction brace)   Other Precautions Fall Risk;Pain   Pain Assessment   Pain Assessment 0-10   Pain Score 4   Pain Type Acute pain;Surgical pain   Pain Location Elbow; Shoulder   Pain Orientation Left   Home Living   Type of Home House  (bi-level)   Home Layout Two level  (4 TALI then 6 steps to living floor)   Bathroom Shower/Tub Walk-in shower   [address] [place] [address] chair   Home Equipment (none)   Prior Function   Level of [place] Needs assistance with IADLs  (independent with mobility, assist for ADLS)   Lives With Spouse   Receives Help From Family   ADL Assistance Needs assistance   IADLs Needs assistance   Vocational (works with air conditioners)   Lifestyle   Intrinsic Gratification getting back to work    Psychosocial   Length of Time/Family Visitation Constant  (wife present for session)   Subjective   Subjective "i'm a wimp" when referring to pain/movement/nervous since [address] 4  Minimal Assistance  (pt is left hand dominant)   Grooming Assistance 4  Minimal Assistance   UB Bathing Assistance 3  Moderate Assistance   LB Bathing Assistance 3  Moderate Assistance   UB Dressing Assistance 2  Maximal Assistance   LB Dressing Assistance 3  Moderate Assistance   [address]  4  Minimal Assistance   Toileting Deficit Clothing management up;Clothing management down   Bed Mobility   Supine to Sit 4  Minimal assistance   Additional items Verbal cues   Transfers   Sit to Stand 5  Supervision   Stand to Sit 5  Supervision   Stand pivot 5  Supervision   Functional Mobility   Functional Mobility 5  Supervision   Additional Comments 20 feet   Balance   Static Sitting Fair +   Dynamic Sitting Fair   Static Standing Fair   Dynamic Standing Fair   Activity Tolerance   Activity Tolerance Patient limited by pain   Nurse Made Aware yes   RUE Assessment   RUE Assessment WNL   LUE Assessment   LUE Assessment (PROM flexion shoulder to 40 degrees tolerance)   LUE Overall AROM   L Elbow Flexion AAROM limited due to elbow pain, -20 to 90   L Wrist Flexion WFL active   L Mass Grasp WFL active   Hand Function   Gross Motor Coordination Functional   Fine Motor Coordination Functional   Sensation   Light Touch No apparent deficits   Additional Comments + tremor LUE/hand    Cognition   Overall Cognitive Status WFL   Arousal/Participation Cooperative   Attention Within functional limits   Orientation Level Oriented X4   Following Commands Follows all commands and directions without difficulty   Comments pt is anxious/nervous due to surgery and pain    Assessment   Limitation Decreased ADL status; Decreased UE ROM; Decreased Safe judgement during ADL;Decreased endurance;Decreased self-care trans;Decreased high-level ADLs   Prognosis Good   Assessment Patient evaluated by Occupational Therapy  Patient admitted with Shoulder arthritis s/p total shoulder replacement left  The patients occupational profile, medical and therapy history includes a extensive additional review of physical, cognitive, or psychosocial history related to current functional performance  Comorbidities affecting functional mobility and ADLS include: tremor, arthritis and diabetes  Prior to admission, patient was living with wife in a bi-level home, working,  independent with functional mobility without assistive device, requiring assist for ADLS and requiring assist for [place]    The evaluation identifies the following performance deficits: weakness, decreased ROM, impaired balance, decreased endurance, increased fall risk, new onset of impairment of functional mobility, decreased ADLS, decreased IADLS, pain, decreased activity tolerance, decreased safety awareness, impaired judgement, ortheopedic restrictions and decreased strength, that result in activity limitations and/or participation restrictions  This evaluation requires clinical decision making of high complexity, because the patient presents with comorbidites that affect occupational performance and required significant modification of tasks or assistance with consideration of multiple treatment options  The Barthel Index was used as a functional outcome tool presenting with a score of 55, indicating marked limitations of functional mobility and ADLS  Patient will benefit from skilled Occupational Therapy services to address above deficits and facilitate a safe return to prior level of function  Patient requires supervision for transfers and mobility and wife will be present with patient at home  Skilled PT evaluation deferred as pt is supervision for mobility and transfers  Goals   Patient Goals to get back to work   STG Time Frame (1-7 days)   Short Term Goal  Goals established to promote patient goal of getting back to work:  Patient will increase standing tolerance to 5 minutes during ADL task to decrease assistance level and decrease fall risk; Patient will increase bed mobility to supervision in preparation for ADLS and transfers; Patient will increase functional mobility to and from bathroom with no assistive device independently to increase performance with ADLS and to use a toilet; Patient will complete LUE ROM/pendulum exercises per Dr Esther Harris total shoulder replacement protocol with supervision; Patient will improve functional activity tolerance to 10 minutes of sustained functional tasks to increase participation in basic self-care and decrease assistance level;   Patient will increase dynamic standing balance to fair+ to improve postural stability and decrease fall risk during standing ADLS and transfers       LTG Time Frame (8-14 days)   Long Term Goal Goals established to promote patient goal of getting back to work:  Patient will increase standing tolerance to 10 minutes during ADL task to decrease assistance level and decrease fall risk; Patient will increase bed mobility to independent in preparation for ADLS and transfers; Patient will complete LUE ROM/pendulum exercises per Dr Reji Tidwell total shoulder replacement protocol independently; Patient will improve functional activity tolerance to 20 minutes of sustained functional tasks to increase participation in basic self-care and decrease assistance level;   Patient will increase dynamic standing balance to good to improve postural stability and decrease fall risk during standing ADLS and transfers  Functional Transfer Goals   Pt Will Perform All Functional Transfers (STG independent )   ADL Goals   Pt Will Perform Eating (STG supervision LTG independent )   Pt Will Perform Grooming (STG supervision LTG independent )   Pt Will Perform Bathing (STG min assist LTG supervision )   Pt Will Perform UE Dressing (STG mod assist LTG Min assist )   Pt Will Perform LE Dressing (STG min assist LTG supervision )   Pt Will Perform Toileting (STG supervision LTG independent )   Plan   Treatment Interventions ADL retraining;Functional transfer training;UE strengthening/ROM; Endurance training;Patient/family training;Equipment evaluation/education; Activityengagement; Energy conservation   Goal Expiration Date 09/21/18   Treatment Day 1   OT Frequency 3-5x/wk   Additional Treatment Session   Start Time 0850   End Time 0930   Treatment Assessment Patient completed urination over toilet with supervision, min assist for underwear management up  Patient stood to wash hand at sink with min assist for retrieval of items  Functional mobility 20 feet with supervision  Patient instructed on pendulum exercises and AROM elbow, wrist and hand and use of brace (donning/doffing and positioning)  Patient and wife verablized and deomonstrated understanding  UE dressing in stance with superivsion for balance max assist to lucien button up shirt    Mod assist to lucien sweatpants  Patient requires verbal cues  Patients wife was very receptive and reports she will be helping patient at home and was prior to surgery  Patient is cooperative and pleasant  Tolerated well      Recommendation   OT Discharge Recommendation Home with family support  (outpatient PT and ortho follow-up)   Barthel Index   Feeding 5   Bathing 0   Grooming Score 0   Dressing Score 0   Bladder Score 10   Bowels Score 10   Toilet Use Score 5   Transfers (Bed/Chair) Score 10   Mobility (Level Surface) Score 10   Stairs Score 5   Barthel Index Score 54   Licensure   [place] License Number  [facility] OTR/L [id number]

## 2018-09-07 NOTE — PLAN OF CARE
MUSCULOSKELETAL - ADULT     Maintain or return mobility to safest level of function Adequate for Discharge     Maintain proper alignment of affected body part Adequate for Discharge        SKIN/TISSUE INTEGRITY - ADULT     Incision(s), wounds(s) or drain site(s) healing without S/S of infection Adequate for Discharge          DISCHARGE PLANNING - CARE MANAGEMENT     Discharge to post-acute care or home with appropriate resources Completed        SKIN/TISSUE INTEGRITY - ADULT     Skin integrity remains intact Completed     Oral mucous membranes remain intact Completed

## 2018-09-07 NOTE — DISCHARGE INSTRUCTIONS
Sling:   Wear your sling at all times after your surgery (this includes sleeping), except for when you are doing pendulum exercises, showering, or physical therapy  Additionally, you should not carry anything heavier than a pencil in your hand  Dressing:   Leave dressing in place  Sleeping:   You will most likely have difficulty sleeping in the first few weeks after surgery  Most people find it more comfortable to sleep in a reclining position  You can either sleep in a recliner chair or create this position with pillows  Ice:   You can ice the shoulder to reduce swelling and discomfort  Do not ice the shoulder more than 20 minutes at a time  Let the shoulder warm up before reapplication  Avoid getting you wound wet  If you have a Cryocuff you may keep this on continuously  Follow-up visit:   You need to see the doctor about one week following surgery for your first post-op visit  At that time your sutures (stitches) will be removed  You will be given a prescription to begin physical therapy if you were not already given one  Common concerns:   Bruising and/or swelling of the shoulder, arm, or hand are common after surgery  To relieve this discomfort it is best to ice the shoulder  Please call if:   1  Any oozing or redness of the wound, fevers (>101 3°F), or chills  2  Any difficulty breathing or heaviness in the chest      REMEMBER - these are only guidelines for what to expect  If you have any questions or concerns, please do not hesitate to call the office  [phone]

## 2018-09-10 NOTE — CASE MANAGEMENT
Auth:    [id number]      Notification of Discharge  This is a Notification of Discharge from our facility [address]  Please be advised that this patient has been discharge from our facility  Below you will find the admission and discharge date and time including the patients disposition  PRESENTATION DATE: 9/6/2018  9:33 AM  IP ADMISSION DATE: 9/6/18 1434  DISCHARGE DATE: 9/7/2018  3:01 PM  DISPOSITION: Home/Self Care    [address] [facility] in the [facility] by [facility] Utilization Review Department  Phone: [phone]; Fax [phone]  ATTENTION: The Network Utilization Review Department is now centralized for our 9 Facilities  Make a note that we have a new phone and fax numbers for our Department  Please call with any questions or concerns to [phone] and carefully follow the prompts so that you are directed to the right person  All voicemails are confidential  Fax any determinations, approvals, denials, and requests for initial or continue stay review clinical to [phone]  Due to HIGH CALL volume, it would be easier if you could please send faxed requests to expedite your requests and in part, help us provide discharge notifications faster

## 2018-09-11 ENCOUNTER — APPOINTMENT (OUTPATIENT)
Dept: RADIOLOGY | Facility: CLINIC | Age: 63
End: 2018-09-11
Payer: COMMERCIAL

## 2018-09-11 ENCOUNTER — OFFICE VISIT (OUTPATIENT)
Dept: OBGYN CLINIC | Facility: CLINIC | Age: 63
End: 2018-09-11

## 2018-09-11 VITALS — BODY MASS INDEX: 28.99 KG/M2 | WEIGHT: 174 LBS | HEIGHT: 65 IN

## 2018-09-11 DIAGNOSIS — M75.112 INCOMPLETE TEAR OF LEFT ROTATOR CUFF: ICD-10-CM

## 2018-09-11 DIAGNOSIS — Z96.612 HISTORY OF ARTHROPLASTY OF LEFT SHOULDER: ICD-10-CM

## 2018-09-11 DIAGNOSIS — M75.112 INCOMPLETE TEAR OF LEFT ROTATOR CUFF: Primary | ICD-10-CM

## 2018-09-11 PROCEDURE — 99024 POSTOP FOLLOW-UP VISIT: CPT | Performed by: ORTHOPAEDIC SURGERY

## 2018-09-11 PROCEDURE — 73030 X-RAY EXAM OF SHOULDER: CPT

## 2018-09-11 NOTE — PROGRESS NOTES
Patient Name:  Brayan Mesa  MRN:  [id number]    Assessment & Plan    Status post left shoulder total arthroplasty (5 days)  1  Estela Crespo appears to be doing well  The incision is clean dry intact with no signs of erythema or infection  He does demonstrate normal sensation into the distal end of the left arm  X-rays demonstrate an appropriately aligned prosthesis  Estela Crespo can start physical therapy the due to his insurance he may go once a week  He may shower now  However, he is to avoid scrubbing at the incision  I would like him to continue use of the sling while he is out about  This will be for approximately 4-6 weeks  However at rest she may remove sling for his instructed to support the elbow with pillows  I encouraged him to use the sling for sleeping at night  However if he is to symptomatic with the numbness while wearing the sling he may take it off however is instructed to bolster the arm with pillows to avoid extension and external rotation of the arm  I would like to see Estela Crespo back in 6 weeks for repeat evaluation  Krishna Babcock is a 76-year-old male who is 5 days status post left total shoulder arthroplasty  He states that his pain level is approximately 1 or 2 at a 10  He notes most of his discomfort is into his wrist and hand as he notes mild numbness and tingling that is alleviated when he is able to extend his elbow  Otherwise he denies any pain or discomfort  He does note that his hand is swollen however is able to move his fingers with little to no pain or discomfort  Today he denies any distal paresthesias      Objective    Ht 5' 5" (1 651 m)   Wt 78 9 kg (174 lb)   BMI 28 96 kg/m²     Incision is clean dry and intact with no signs of infection  He does demonstrate intact sensation along the upper arm forearm wrist and hand  Edema is demonstrated into the fingers and hand  However hand is warm to the touch with appropriate capillary refill        Data Review    I have personally reviewed pertinent films in PACS, and my interpretation follows  X-rays of the left shoulder demonstrates appropriately positioned total shoulder prosthesis  With appropriate anatomical alignment of the glenoid and humeral head prosthesis  No signs of dislocation demonstrated        Scribe Attestation    I,:   Geoffery Aase am acting as a scribe while in the presence of the attending physician :        I,:   Jordana Walsh MD personally performed the services described in this documentation    as scribed in my presence :

## 2018-09-19 ENCOUNTER — TELEPHONE (OUTPATIENT)
Dept: OBGYN CLINIC | Facility: CLINIC | Age: 63
End: 2018-09-19

## 2018-09-19 NOTE — TELEPHONE ENCOUNTER
We have seen him many times for this and have not appreciated any swelling  If he feels this has occurred more recently we can certainly see him back to evaluate this

## 2018-09-19 NOTE — TELEPHONE ENCOUNTER
Patient had shoulder replacement with Dr Sheree Arias recently  Wife states he is having extreme swelling in his arm and hand  This is where all his pain is coming from  Would like to know what to do to help elevate the swelling in these areas?

## 2018-09-21 ENCOUNTER — OFFICE VISIT (OUTPATIENT)
Dept: OBGYN CLINIC | Facility: CLINIC | Age: 63
End: 2018-09-21

## 2018-09-21 ENCOUNTER — EVALUATION (OUTPATIENT)
Dept: PHYSICAL THERAPY | Facility: CLINIC | Age: 63
End: 2018-09-21
Payer: COMMERCIAL

## 2018-09-21 DIAGNOSIS — M75.112 INCOMPLETE TEAR OF LEFT ROTATOR CUFF: ICD-10-CM

## 2018-09-21 DIAGNOSIS — Z96.612 HISTORY OF ARTHROPLASTY OF LEFT SHOULDER: Primary | ICD-10-CM

## 2018-09-21 DIAGNOSIS — M75.02 ADHESIVE CAPSULITIS OF LEFT SHOULDER: ICD-10-CM

## 2018-09-21 PROCEDURE — 99024 POSTOP FOLLOW-UP VISIT: CPT | Performed by: ORTHOPAEDIC SURGERY

## 2018-09-21 PROCEDURE — G8984 CARRY CURRENT STATUS: HCPCS

## 2018-09-21 PROCEDURE — G8985 CARRY GOAL STATUS: HCPCS

## 2018-09-21 PROCEDURE — 97161 PT EVAL LOW COMPLEX 20 MIN: CPT

## 2018-09-21 NOTE — PROGRESS NOTES
Assessment/Plan:  1  History of arthroplasty of left shoulder     2  Incomplete tear of left rotator cuff         Scribe Attestation    I,:   Iveth Ferro MA am acting as a scribe while in the presence of the attending physician :        I,:   Dinh Phillips MD personally performed the services described in this documentation    as scribed in my presence :              I discussed with Gina Persaud and his wife today that the swelling in the dorsal aspect of left hand is likely dependent edema caused by the sling use  He is able to actively move all digits but this is restricted due to swelling  He has very mild swelling to his left forearm  I discussed with him today that I would like him to d/c the use of the sling while at rest and only use the sling when he is up and active  He will follow up with me at his previously scheduled appointment in about 5 weeks  Subjective:   Cornelio Vasquez is a 60 y o  male who presents 2 weeks s/p left shoulder total arthroplasty performed on 9/6/18  He is here today for evaluation of increased swelling  He states he noted increased swelling to the dorsal aspect of his left hand that has been ongoing for approximately 1 week  He does not note any increased shoulder pain  Review of Systems   Constitutional: Negative for chills and fever  HENT: Negative for drooling and sneezing  Eyes: Negative for redness  Respiratory: Negative for cough and wheezing  Gastrointestinal: Negative for nausea and vomiting  Musculoskeletal: Negative for arthralgias, joint swelling and myalgias  Neurological: Negative for weakness and numbness  Psychiatric/Behavioral: Negative for behavioral problems  The patient is not nervous/anxious            Past Medical History:   Diagnosis Date    Arthritis     Diabetes ([address] )     Tremor     on left arm - told due to shoulder problem       Past Surgical History:   Procedure Laterality Date    COLONOSCOPY      CYST REMOVAL      [place] RECONSTR TOTAL SHOULDER IMPLANT Left 9/6/2018    Procedure: TOTAL SHOULDER ARTHROPLASTY;  Surgeon: Maribeth Chauhan MD;  [facility];  Service: Orthopedics    UNDESCENDED TESTICLE EXPLORATION         History reviewed  No pertinent family history  Social History     Occupational History    Not on file  Social History Main Topics    Smoking status: Never Smoker    Smokeless tobacco: Never Used    Alcohol use Yes      Comment: rarely    Drug use: No    Sexual activity: Not on file         Current Outpatient Prescriptions:     diclofenac sodium (VOLTAREN) 1 %, Apply topically daily as needed  , Disp: , Rfl: 0    lisinopril (ZESTRIL) 10 mg tablet, 10 mg every evening  , Disp: , Rfl: 1    metFORMIN (GLUCOPHAGE-XR) 750 mg 24 hr tablet, 750 mg daily with breakfast  , Disp: , Rfl: 5    Allergies   Allergen Reactions    Other      Cats       Objective: There were no vitals filed for this visit  Ortho Exam   Left Upper Extremity   Dependent swelling noted to the dorsal aspect of his left hand  Able to actively move all digits but this is restricted due to swelling  Very mild swelling noted to the left forearm   Begin appearing incisions no signs of infection   Full elbow ROM      Physical Exam   Constitutional: He is oriented to person, place, and time  He appears well-developed and well-nourished  HENT:   Head: Atraumatic  Eyes: Conjunctivae are normal    Neck: Normal range of motion  Cardiovascular: Normal rate  Pulmonary/Chest: Effort normal    Musculoskeletal:   As noted in HPI   Neurological: He is alert and oriented to person, place, and time  Skin: Skin is warm and dry  Psychiatric: He has a normal mood and affect   His behavior is normal  Judgment and thought content normal

## 2018-09-21 NOTE — PROGRESS NOTES
PT Evaluation     Today's date: 2018  Patient name: Fabio Mcintyre  : 1955  MRN: [id number]  Referring provider: Jeimy Morse MD  Dx:   Encounter Diagnosis     ICD-10-CM    1  Incomplete tear of left rotator cuff M75 112 Ambulatory referral to Physical Therapy   2  Adhesive capsulitis of left shoulder M75 02 Ambulatory referral to Physical Therapy                  Assessment  Impairments: abnormal or restricted ROM, abnormal movement, activity intolerance, impaired physical strength, lacks appropriate home exercise program, pain with function, scapular dyskinesis and poor posture     Assessment details: Fabio Mcintyre is a 60 y o  male who presents to physical therapy post op with pain, decreased UE range of motion, decreased UE strength, impaired function, decreased activity tolerance and poor posture  Patient's clinical presentation is consistent with their referring diagnosis of Presence of artificial left shoulder  The pt presents with functional limitations of ADLs, recreational activities, work-related activities, performing household chores, self-care and reaching  Pt would benefit from physical therapy services to address these limitations and maximize function  Pt was instructed and educated on home exercise program, post-op contraindications/precautions, good sitting posture and wound care today and demonstrates understanding     Understanding of Dx/Px/POC: good   Prognosis: good    Goals  Short Term Goals (4-6 weeks)  Pt will be independent in basic Home Exercise Program  Pt will demonstrate improved postural awareness with no cueing required from PT  Pt will demonstrate improved left shoulder PROM TO 90 deg flex and abd, and ER to 45 deg  Pt will report a decrease in resting pain no more than 2/10    Long Term Goals (8-10 weeks)  Pt will be independent in comprehensive Home Exercise Program  Pt will be able to reach in a cabinet at shoulder height  Pt will be independent in self care and basic ADLs  Pt will demonstrate improved strength in shoulder to at least 4/5      Plan  Patient would benefit from: skilled PT  Referral necessary: No  Planned modality interventions: cryotherapy and thermotherapy: hydrocollator packs  Planned therapy interventions: ADL retraining, body mechanics training, functional ROM exercises, home exercise program, graded exercise, joint mobilization, manual therapy, massage, neuromuscular re-education, patient education, postural training, strengthening, stretching, therapeutic activities, therapeutic exercise and therapeutic training  Frequency: 2x week  Duration in weeks: 8  Plan of Care beginning date: 2018  Plan of Care expiration date: 2018  Treatment plan discussed with: patient  Plan details: 1-2 times per week        Subjective Evaluation    History of Present Illness  Date of surgery: 2018  Mechanism of injury: Pt reports he has been dealing with left shoulder pain for a couple of years now  He originally suffered a rotator cuff tear about 2 years and received PRP injections which helped for a while  Pain started to increase and he had progressive loss of ROM  Pt had several consults with Dr Meredith Paris and MRI taken revealing significant spurring in shoulder and a partial RC tear  Pt elected to undergo a total shoulder arthroplasty  Pt presents today in a sling and reports compliance with use  Pt reports that his shoulder pain is minimal to none but he has been suffering from increased swelling and pain about the left wrist since the surgery  Has a follow up this afternoon for this  States the swelling has been present since before the surgery and was told it could be from the dependent positioning secondary to his ROM limitations  Denies any numbness/tingling or red flag symptoms  Pt has moderate sleep disturbances  Has no use of L UE following post op precautions     Pain  No pain reported  Current pain ratin  At best pain ratin  At worst pain ratin  Quality: tight and dull ache  Relieving factors: ice and medications    Social Support    Employment status: working (AC and heating, lifting)  Hand dominance: left      Diagnostic Tests  X-ray: abnormal  MRI studies: abnormal  Treatments  Previous treatment: medication and physical therapy  Current treatment: immobilization  Patient Goals  Patient goals for therapy: decreased pain, decreased edema, increased motion, increased strength, independence with ADLs/IADLs and return to work          Objective     Postural Observations  Seated posture: poor  Standing posture: poor  Correction of posture: makes symptoms better        Observations   Left Shoulder   Positive for edema (left hand), incision and spasms  Additional Observation Details  Incision left anterior shoulder inspected, good signs of healing  Clean, dry, intact  Palpation   Left   Tenderness of the biceps, pectoralis minor and upper trapezius  Tenderness     Left Shoulder   Tenderness in the acromion       Right Shoulder  No tenderness     Cervical/Thoracic Screen   Cervical range of motion within normal limits with the following exceptions: Bilateral rotation, left greater than right  Thoracic range of motion within normal limits with the following exceptions: Into extension    Neurological Testing     Sensation     Shoulder   Left Shoulder   Intact: light touch    Right Shoulder   Intact: light touch    Active Range of Motion     Right Shoulder   Flexion: 160 degrees   Abduction: 145 degrees   External rotation 45°: 60 degrees   Internal rotation 45°: 80 degrees     Additional Active Range of Motion Details  Left shoulder AROM not tested secondary to post op precautions  Elbow AROM right WNL, left - deg  Moderate restrictions in left wrist and finger AROM secondary to swelling    Passive Range of Motion   Left Shoulder   Flexion: 70 degrees   Abduction: 30 degrees   External rotation 45°: 5 degrees   Internal rotation 45°: 50 degrees     Additional Passive Range of Motion Details  No pain reported with PROM testing    Scapular Mobility   Left Shoulder   Scapular mobility: poor    Strength/Myotome Testing     Left Shoulder     Planes of Motion   Flexion: 2   Abduction: 2   External rotation at 45°: 2   Internal rotation at 45°: 2     Right Shoulder     Planes of Motion   Flexion: 4+   Abduction: 4+   External rotation at 45°: 4+   Internal rotation at 45°: 4+       Flowsheet Rows      Most Recent Value   PT/OT G-Codes   Current Score  4   Projected Score  59   FOTO information reviewed  Yes   Assessment Type  Evaluation   G code set  Carrying, Moving & Handling Objects   Carrying, Moving and Handling Objects Current Status ()  CM   Carrying, Moving and Handling Objects Goal Status ()  CK          Pt was given initial Home Exercise Program today and demonstrates understanding   RAP Index access code: [email or web address]    Precautions TSA rehab protocol    Specialty Daily Treatment Diary     Manual         PROM shoulder flex and ER        STM                                    Exercise Diary          squeezes        Elbow flex and ext AROM        Shoulder pendulums        CS rotation         UT stretch        scap squeezes        Self PROM flexion        Cane ER                                                                    Modalities        CP

## 2018-09-27 NOTE — PROGRESS NOTES
Daily Note     Today's date: 2018  Patient name: Keegan Shaikh  : 1955  MRN: [id number]  Referring provider: Goldy Vasques MD  Dx:   Encounter Diagnoses   Name Primary?  History of arthroplasty of left shoulder Yes    Incomplete tear of left rotator cuff     Adhesive capsulitis of left shoulder                   Subjective: Pt reports his shoulder has been feel pretty good  Swelling in his left hand persists and rated his elbow and wrist pain is rated 4/10 currently  Objective: See treatment diary below    Precautions TSA rehab protocol    Specialty Daily Treatment Diary     Manual         PROM shoulder flex and ER and elbow flex and ext 2x10 ea       STM Retro to left hand                                    Exercise Diary          squeezes 1x10       Elbow flex and ext AROM 2x10       Shoulder pendulums        CS rotation  1x10       UT stretch        scap squeezes 2x10       Self PROM flexion Hold 5, 3x5       Cane ER Hold 5, 2x10                                                                   Modalities        CP                            Assessment: Pt tolerated treatment well with minimal complaints of pain  Pt with improved swelling in hand today compared to last week  Pt's wife educated on retrograde massage for home, demonstrates understanding  Reviewed initial HEP and updated with self PROM into ER and flexion, with patient and demonstrates independence  Plan: Continue with plan of care to decrease pain, improve mobility, strength, and function  Progress treatment per surgical protocol

## 2018-09-28 ENCOUNTER — OFFICE VISIT (OUTPATIENT)
Dept: PHYSICAL THERAPY | Facility: CLINIC | Age: 63
End: 2018-09-28
Payer: COMMERCIAL

## 2018-09-28 DIAGNOSIS — Z96.612 HISTORY OF ARTHROPLASTY OF LEFT SHOULDER: Primary | ICD-10-CM

## 2018-09-28 DIAGNOSIS — M75.02 ADHESIVE CAPSULITIS OF LEFT SHOULDER: ICD-10-CM

## 2018-09-28 DIAGNOSIS — M75.112 INCOMPLETE TEAR OF LEFT ROTATOR CUFF: ICD-10-CM

## 2018-09-28 PROCEDURE — 97140 MANUAL THERAPY 1/> REGIONS: CPT

## 2018-09-28 PROCEDURE — 97110 THERAPEUTIC EXERCISES: CPT

## 2018-10-05 ENCOUNTER — OFFICE VISIT (OUTPATIENT)
Dept: PHYSICAL THERAPY | Facility: CLINIC | Age: 63
End: 2018-10-05
Payer: COMMERCIAL

## 2018-10-05 DIAGNOSIS — M75.02 ADHESIVE CAPSULITIS OF LEFT SHOULDER: ICD-10-CM

## 2018-10-05 DIAGNOSIS — M75.112 INCOMPLETE TEAR OF LEFT ROTATOR CUFF: ICD-10-CM

## 2018-10-05 DIAGNOSIS — Z96.612 HISTORY OF ARTHROPLASTY OF LEFT SHOULDER: Primary | ICD-10-CM

## 2018-10-05 PROCEDURE — 97110 THERAPEUTIC EXERCISES: CPT

## 2018-10-05 PROCEDURE — 97140 MANUAL THERAPY 1/> REGIONS: CPT

## 2018-10-05 NOTE — PROGRESS NOTES
Daily Note     Today's date: 10/5/2018  Patient name: Cris Castañeda  : 1955  MRN: [id number]  Referring provider: Shahram Ramirez MD  Dx:   Encounter Diagnoses   Name Primary?  History of arthroplasty of left shoulder Yes    Incomplete tear of left rotator cuff     Adhesive capsulitis of left shoulder                   Subjective: Pt reports minimal to no pain in shoulder this past week, has progressed out of the sling at home and only uses it in the community  Pt states that most of his pain continues at left wrist        Objective: See treatment diary below    Precautions TSA rehab protocol    Specialty Daily Treatment Diary     Manual  9/28 10/5      PROM shoulder flex, abd and ER and elbow flex and ext 2x10 ea 2x10 ea      STM Retro to left hand  Retro to left hand                                   Exercise Diary          squeezes 1x10 1x10      Elbow flex and ext AROM 2x10 2x10      Shoulder pendulums        CS rotation  1x10       UT stretch        scap squeezes 2x10 2x10      Self PROM flexion Hold 5, 3x5 Hold 5, 3x5      Cane ER Hold 5, 2x10 Hold 5, 2x10      Cane abduction  Hold 5, 3x5                                                          Modalities        CP                              Assessment: Pt tolerated treatment well with no complaints of pain  Pt demonstrating improved PROM in shoulder, measured at 95 deg of flexion and 80 deg of abductions  Pt given wrist tendon glides and cane PROM abduction for HEP, demonstrates understanding  Plan: Continue with plan of care to decrease pain, improve mobility, strength, and function  Progress treatment per surgical protocol

## 2018-10-08 ENCOUNTER — TELEPHONE (OUTPATIENT)
Dept: RHEUMATOLOGY | Facility: CLINIC | Age: 63
End: 2018-10-08

## 2018-10-08 NOTE — TELEPHONE ENCOUNTER
Patient wife called in concern of her ; Who is still having pain in his shoulder and hand since SX  She wanted to know if he uses suzanne essential oil would that be ok  Please advise thank you

## 2018-10-09 ENCOUNTER — OFFICE VISIT (OUTPATIENT)
Dept: PHYSICAL THERAPY | Facility: CLINIC | Age: 63
End: 2018-10-09
Payer: COMMERCIAL

## 2018-10-09 DIAGNOSIS — Z96.612 HISTORY OF ARTHROPLASTY OF LEFT SHOULDER: Primary | ICD-10-CM

## 2018-10-09 DIAGNOSIS — M75.02 ADHESIVE CAPSULITIS OF LEFT SHOULDER: ICD-10-CM

## 2018-10-09 DIAGNOSIS — M75.112 INCOMPLETE TEAR OF LEFT ROTATOR CUFF: ICD-10-CM

## 2018-10-09 PROCEDURE — 97110 THERAPEUTIC EXERCISES: CPT

## 2018-10-09 PROCEDURE — 97140 MANUAL THERAPY 1/> REGIONS: CPT

## 2018-10-09 NOTE — PROGRESS NOTES
Daily Note     Today's date: 10/9/2018  Patient name: Jose Angel Levy  : 1955  MRN: [id number]  Referring provider: Lisa Petty MD  Dx:   Encounter Diagnoses   Name Primary?  History of arthroplasty of left shoulder Yes    Incomplete tear of left rotator cuff     Adhesive capsulitis of left shoulder                   Subjective: Pt reports he has noticed a decrease in hand swelling since D/Cing the brace  Significant difficulties sleeping secondary to wrist pain and finding a comfortable position  Objective: See treatment diary below    Precautions TSA rehab protocol    Specialty Daily Treatment Diary     Manual  9/28 10/5 10/9     PROM shoulder flex, abd and ER and elbow flex and ext 2x10 ea 2x10 ea 2x10 ea     STM Retro to left hand  Retro to left hand  Retro to left hand                                  Exercise Diary          squeezes 1x10 1x10 1x10 yellow     Elbow flex and ext AROM 2x10 2x10 2x10     Shoulder pendulums        CS rotation  1x10       UT stretch        scap squeezes 2x10 2x10 2x10     Self PROM flexion Hold 5, 3x5 Hold 5, 3x5 Hold 5, 3x5     Cane ER Hold 5, 2x10 Hold 5, 2x10 Hold 5, 2x10     Cane abduction  Hold 5, 3x5 Hold 5, 3x5     Tendon glides   1x10 ea                                                 Modalities        CP                              Assessment: Pt tolerated treatment well with minimal complaints of pain  Progressing well with PROM of shoulder with minimal end range pain and firm end feels  Pt continues with significant weakness in , unable to make a full fist          Plan: Continue with plan of care to decrease pain, improve mobility, strength, and function  Progress treatment per surgical protocol

## 2018-10-19 ENCOUNTER — OFFICE VISIT (OUTPATIENT)
Dept: PHYSICAL THERAPY | Facility: CLINIC | Age: 63
End: 2018-10-19
Payer: COMMERCIAL

## 2018-10-19 DIAGNOSIS — M75.02 ADHESIVE CAPSULITIS OF LEFT SHOULDER: ICD-10-CM

## 2018-10-19 DIAGNOSIS — Z96.612 HISTORY OF ARTHROPLASTY OF LEFT SHOULDER: Primary | ICD-10-CM

## 2018-10-19 DIAGNOSIS — M75.112 INCOMPLETE TEAR OF LEFT ROTATOR CUFF: ICD-10-CM

## 2018-10-19 PROCEDURE — 97140 MANUAL THERAPY 1/> REGIONS: CPT

## 2018-10-19 PROCEDURE — 97110 THERAPEUTIC EXERCISES: CPT

## 2018-10-19 NOTE — PROGRESS NOTES
Daily Note     Today's date: 10/19/2018  Patient name: Senait Cardenas  : 1955  MRN: [id number]  Referring provider: Benjamin Hartley MD  Dx:   Encounter Diagnoses   Name Primary?  History of arthroplasty of left shoulder Yes    Incomplete tear of left rotator cuff     Adhesive capsulitis of left shoulder                   Subjective: Pt reports improved swelling in hand  Continues to wake up every 2 hours with significant pain in left wrist and swelling  Shoulder feels stiff this morning  Objective: See treatment diary below    Precautions TSA rehab protocol    Specialty Daily Treatment Diary     Manual  9/28 10/5 10/9 10/19    PROM shoulder flex, abd and ER and elbow flex and ext 2x10 ea 2x10 ea 2x10 ea 2x10 ea    STM Retro to left hand  Retro to left hand  Retro to left hand  Retro to left hand                                 Exercise Diary          squeezes 1x10 1x10 1x10 yellow     Elbow flex and ext AROM 2x10 2x10 2x10 2x10    Shoulder pendulums        CS rotation  1x10       UT stretch        scap squeezes 2x10 2x10 2x10 2x10    Self PROM flexion Hold 5, 3x5 Hold 5, 3x5 Hold 5, 3x5 Hold 5, 2x5    Cane ER Hold 5, 2x10 Hold 5, 2x10 Hold 5, 2x10 Hold 5, 2x10    Cane abduction  Hold 5, 3x5 Hold 5, 3x5 Hold 5, 3x5    Tendon glides   1x10 ea 1x10 ea    Cane flexion    Hold 5, 2x5    RC isometrics    3 way, hold 5, 1x10 ea                                Modalities        CP                            Assessment: Progressed pt's program per treatment diary and demonstrates good tolerance  Initiated AAROM of shoulder and RC isometric today with no c/o pain per protocol  Pt's PROM improved, measured at 90 deg of flexion and abd and 40 deg of ER  Pt's HEP updated today and demonstrates understanding  Plan: Progress treatment per surgical protocol  Progress note next visit

## 2018-10-23 ENCOUNTER — OFFICE VISIT (OUTPATIENT)
Dept: OBGYN CLINIC | Facility: CLINIC | Age: 63
End: 2018-10-23

## 2018-10-23 VITALS — BODY MASS INDEX: 31.57 KG/M2 | WEIGHT: 189.5 LBS | HEIGHT: 65 IN

## 2018-10-23 DIAGNOSIS — M19.019 SHOULDER ARTHRITIS: Primary | ICD-10-CM

## 2018-10-23 PROCEDURE — 99024 POSTOP FOLLOW-UP VISIT: CPT | Performed by: ORTHOPAEDIC SURGERY

## 2018-10-23 NOTE — PROGRESS NOTES
Assessment/Plan:  1  Shoulder arthritis         Ga Mancini is doing well from shoulder shows active, but does have diffuse swelling into his hand  He will discontinue use of his sling at this point, which should help his hand swelling and pain  He will continue physical therapy on the total shoulder protocol  We will see him back in 6 weeks for repeat evaluation  Subjective:   Yashira Thomas is a 60 y o  male who presents today for follow-up of his left shoulder, now 7 weeks status post total shoulder replacement  He denies any pain about the shoulder but does complain of discomfort about the forearm, wrist, and hand  He also notes significant swelling into the hand still  He feels his range of motion is progressing well with physical therapy  He notes good sensation of the left upper extremity  Review of Systems      Past Medical History:   Diagnosis Date    Arthritis     Diabetes ([address] )     Tremor     on left arm - told due to shoulder problem       Past Surgical History:   Procedure Laterality Date    COLONOSCOPY      CYST REMOVAL      OK RECONSTR TOTAL SHOULDER IMPLANT Left 9/6/2018    Procedure: TOTAL SHOULDER ARTHROPLASTY;  Surgeon: Jessica Silva MD;  Location: [address] [place] [address];  Service: Orthopedics    UNDESCENDED TESTICLE EXPLORATION         History reviewed  No pertinent family history  Social History     Occupational History    Not on file  Social History Main Topics    Smoking status: Never Smoker    Smokeless tobacco: Never Used    Alcohol use Yes      Comment: rarely    Drug use: No    Sexual activity: Not on file         Current Outpatient Prescriptions:     lisinopril (ZESTRIL) 10 mg tablet, 10 mg every evening  , Disp: , Rfl: 1    metFORMIN (GLUCOPHAGE-XR) 750 mg 24 hr tablet, 750 mg daily with breakfast  , Disp: , Rfl: 5    Allergies   Allergen Reactions    Other      Cats       Objective: There were no vitals filed for this visit      Ortho Exam    Physical Exam    Left shoulder:  Well-healed surgical scar  No erythema  No pain with gentle passive range of motion shoulder  Strength testing deferred  Diffuse swelling into left hand, with restricted range of motion of all digits due to swelling   Sensation intact left upper extremity

## 2018-10-26 ENCOUNTER — OFFICE VISIT (OUTPATIENT)
Dept: PHYSICAL THERAPY | Facility: CLINIC | Age: 63
End: 2018-10-26
Payer: COMMERCIAL

## 2018-10-26 DIAGNOSIS — Z96.612 HISTORY OF ARTHROPLASTY OF LEFT SHOULDER: Primary | ICD-10-CM

## 2018-10-26 DIAGNOSIS — M75.112 INCOMPLETE TEAR OF LEFT ROTATOR CUFF: ICD-10-CM

## 2018-10-26 DIAGNOSIS — M75.02 ADHESIVE CAPSULITIS OF LEFT SHOULDER: ICD-10-CM

## 2018-10-26 PROCEDURE — G8984 CARRY CURRENT STATUS: HCPCS

## 2018-10-26 PROCEDURE — 97110 THERAPEUTIC EXERCISES: CPT

## 2018-10-26 PROCEDURE — G8985 CARRY GOAL STATUS: HCPCS

## 2018-10-26 PROCEDURE — 97140 MANUAL THERAPY 1/> REGIONS: CPT

## 2018-10-26 NOTE — PROGRESS NOTES
PT Re-Evaluation     Today's date: 10/26/2018  Patient name: Keli Cooley  : 1955  MRN: [id number]  Referring provider: Mindy Tran MD  Dx:   Encounter Diagnosis     ICD-10-CM    1  History of arthroplasty of left shoulder Z96 612    2  Incomplete tear of left rotator cuff M75 112    3  Adhesive capsulitis of left shoulder M75 02                   Assessment  Impairments: abnormal or restricted ROM, abnormal movement, activity intolerance, impaired physical strength, lacks appropriate home exercise program, pain with function, scapular dyskinesis, poor posture  and poor body mechanics    Assessment details: Keli Cooley is a 60 y o  male who has been seen in physical therapy for 6 visits secondary to the diagnosis of History of arthroplasty of left shoulder  (primary encounter diagnosis), Incomplete tear of left rotator cuff and Adhesive capsulitis of left shoulder and is making steady gains towards established goals  The patient has demonstrated decreased pain level, improved strength upper extremity, improved postural awareness, improved upper extremity ROM and reduced swelling  He would benefit from continued PT services to address remaining impairments outlined in Progress Note and to maximize function     Understanding of Dx/Px/POC: good   Prognosis: good    Goals  Short Term Goals (4-6 weeks)  Pt will be independent in basic Home Exercise Program -MET  Pt will demonstrate improved postural awareness with no cueing required from PT-MET  Pt will demonstrate improved left shoulder PROM TO 90 deg flex and abd, and ER to 45 deg- MET  Pt will report a decrease in resting pain no more than 2/10 -PARTIALLY MET    Long Term Goals (8-10 weeks)  Pt will be independent in comprehensive Home Exercise Program -NOT MET  Pt will be able to reach in a cabinet at shoulder height-NOT MET  Pt will be independent in self care and basic ADLs -NOT MET  Pt will demonstrate improved strength in shoulder to at least -NOT MET      Plan  Patient would benefit from: skilled PT  Referral necessary: No  Planned modality interventions: cryotherapy and thermotherapy: hydrocollator packs  Planned therapy interventions: ADL retraining, body mechanics training, functional ROM exercises, home exercise program, graded exercise, joint mobilization, manual therapy, massage, neuromuscular re-education, patient education, postural training, strengthening, stretching, therapeutic activities, therapeutic exercise and therapeutic training  Frequency: 2x week  Duration in weeks: 8  Treatment plan discussed with: patient  Plan details: 1-2 times per week        Subjective Evaluation    History of Present Illness  Date of surgery: 2018  Mechanism of injury: Pt reports he has seen an improvement in his shoulder function and denies any pain about the shoulder except with stretching  States that his main complaint is the continued swelling in left hand and pain in wrist and elbow  States that Dr Elayne Martinez recently discharged the sling and he is able to drive now  Pt states that more recently he has been able to reach for the faucet and the dish soap with his left arm  Pt reports significant difficulties grasping and making a fist  Pt continues to require assistance for dressing and self care from wife    Pain  No pain reported  Current pain ratin  At best pain ratin  At worst pain ratin  Quality: tight and dull ache  Relieving factors: ice and medications  Progression: improved    Social Support    Employment status: working (AC and heating, lifting)  Hand dominance: left      Diagnostic Tests  X-ray: abnormal  MRI studies: abnormal  Treatments  Previous treatment: medication and physical therapy  Current treatment: physical therapy  Patient Goals  Patient goals for therapy: decreased pain, decreased edema, increased motion, increased strength, independence with ADLs/IADLs and return to work (ONGOING)          Objective     Postural Observations  Seated posture: good  Standing posture: fair  Correction of posture: makes symptoms better        Observations   Left Shoulder   Positive for edema (left hand), incision and spasms  Additional Observation Details  Incision left anterior shoulder clean, dry, intact  Palpation   Left   No palpable tenderness to the biceps  Tenderness of the middle deltoid, pectoralis minor and upper trapezius  Tenderness     Left Shoulder   Tenderness in the acromion       Right Shoulder  No tenderness     Cervical/Thoracic Screen   Cervical range of motion within normal limits with the following exceptions: Bilateral rotation, left greater than right  Thoracic range of motion within normal limits with the following exceptions: Into extension    Neurological Testing     Sensation     Shoulder   Left Shoulder   Intact: light touch    Right Shoulder   Intact: light touch    Active Range of Motion   Left Shoulder   Flexion: 45 degrees     Right Shoulder   Flexion: 160 degrees   Abduction: 145 degrees   External rotation 45°: 60 degrees   Internal rotation 45°: 80 degrees     Additional Active Range of Motion Details  Elbow AROM right WNL, left - deg  Moderate restrictions in left wrist and finger AROM secondary to swelling    Passive Range of Motion   Left Shoulder   Flexion: 90 degrees   Abduction: 80 degrees   External rotation 45°: 45 degrees   Internal rotation 45°: 60 degrees     Additional Passive Range of Motion Details  No pain reported with PROM testing    Scapular Mobility   Left Shoulder   Scapular mobility: poor    Strength/Myotome Testing     Left Shoulder     Planes of Motion   Flexion: 3-   Abduction: 3-   External rotation at 45°: 3-   Internal rotation at 45°: 3-     Right Shoulder     Planes of Motion   Flexion: 4+   Abduction: 4+   External rotation at 45°: 4+   Internal rotation at 45°: 4+       Flowsheet Rows      Most Recent Value   PT/OT G-Codes   Current Score  38   Projected Score 61   FOTO information reviewed  Yes   Assessment Type  Re-evaluation   G code set  Carrying, Moving & Handling Objects   Carrying, Moving and Handling Objects Current Status ()  CL   Carrying, Moving and Handling Objects Goal Status ()  CK          Pt was given updated Home Exercise Program today and demonstrates understanding   Flashstarts access code: [email or web address]    Precautions TSA rehab protocol    Specialty Daily Treatment Diary     Manual  9/28 10/5 10/9 10/19 10/26   PROM shoulder flex, abd and ER and elbow flex and ext 2x10 ea 2x10 ea 2x10 ea 2x10 ea 2x10 ea   STM Retro to left hand  Retro to left hand  Retro to left hand  Retro to left hand  Retro to left hand                                Exercise Diary          squeezes 1x10 1x10 1x10 yellow     Elbow flex and ext AROM 2x10 2x10 2x10 2x10 2x10   scaption     2x10   CS rotation  1x10       AAROM supine flexion      MA, 1x10   scap squeezes 2x10 2x10 2x10 2x10 2x10   Self PROM flexion Hold 5, 3x5 Hold 5, 3x5 Hold 5, 3x5 Hold 5, 2x5    Cane ER Hold 5, 2x10 Hold 5, 2x10 Hold 5, 2x10 Hold 5, 2x10 Hold 5, 2x10   Cane abduction  Hold 5, 3x5 Hold 5, 3x5 Hold 5, 3x5 Hold 5, 3x5   Tendon glides   1x10 ea 1x10 ea 1x10 ea   Cane flexion    Hold 5, 2x5 Hold 5, 2x5   RC isometrics    3 way, hold 5, 1x10 ea 3 way, hold 5, 1x10 ea                               Modalities        CP

## 2018-11-02 ENCOUNTER — OFFICE VISIT (OUTPATIENT)
Dept: PHYSICAL THERAPY | Facility: CLINIC | Age: 63
End: 2018-11-02
Payer: COMMERCIAL

## 2018-11-02 DIAGNOSIS — Z96.612 HISTORY OF ARTHROPLASTY OF LEFT SHOULDER: Primary | ICD-10-CM

## 2018-11-02 DIAGNOSIS — M75.02 ADHESIVE CAPSULITIS OF LEFT SHOULDER: ICD-10-CM

## 2018-11-02 DIAGNOSIS — M75.112 INCOMPLETE TEAR OF LEFT ROTATOR CUFF: ICD-10-CM

## 2018-11-02 PROCEDURE — 97110 THERAPEUTIC EXERCISES: CPT

## 2018-11-02 PROCEDURE — 97140 MANUAL THERAPY 1/> REGIONS: CPT

## 2018-11-02 NOTE — PROGRESS NOTES
Daily Note     Today's date: 2018  Patient name: Han Moreland  : 1955  MRN: [id number]  Referring provider: Carolina Bhatti MD  Dx:   Encounter Diagnoses   Name Primary?  History of arthroplasty of left shoulder Yes    Incomplete tear of left rotator cuff     Adhesive capsulitis of left shoulder                   Subjective: Pt admits that he has not been compliant with stretching and HEP as he should  Shoulder feels stiff this morning  0/10 pain  Improved sleep reported but continued pain in left hand  Objective: See treatment diary below    Precautions TSA rehab protocol    Specialty Daily Treatment Diary     Manual  11/2  10/9 10/19 10/26   PROM shoulder flex, abd and ER and elbow flex and ext 2x10 ea  2x10 ea 2x10 ea 2x10 ea   STM Retro to left hand   Retro to left hand  Retro to left hand  Retro to left hand    Supination stretch 10 sec, 1x10                           Exercise Diary          squeezes   1x10 yellow     Elbow flex and ext AROM   2x10 2x10 2x10   scaption 2x10    2x10   AAROM supine flexion      MA, 1x10   Row Yellow, 2x10       scap squeezes   2x10 2x10 2x10   Self PROM flexion   Hold 5, 3x5 Hold 5, 2x5    Cane ER Hold 5, 2x10  Hold 5, 2x10 Hold 5, 2x10 Hold 5, 2x10   Cane abduction Hold 5, 2x10   Hold 5, 3x5 Hold 5, 3x5 Hold 5, 3x5   Tendon glides 1x10 ea  1x10 ea 1x10 ea 1x10 ea   Cane flexion Hold 5, 2x10    Hold 5, 2x5 Hold 5, 2x5   RC isometrics    3 way, hold 5, 1x10 ea 3 way, hold 5, 1x10 ea   pulleys Flexion, 3 min       Finger extension stretch Hold 10, 1x10                   Modalities        CP                            Assessment: Pt tolerated treatment well with minimal complaints of pain  Pt demonstrates improvement in hand swelling  Continues to be limited into finger flexion and extension  Unable to make a full fist  Pt educated on importance of being compliant with HEP, especially since pt is being seen in PT once a week secondary to insurance   Pt in agreement and demonstrates understanding  Plan: Progress treatment per surgical protocol

## 2018-11-09 ENCOUNTER — APPOINTMENT (OUTPATIENT)
Dept: PHYSICAL THERAPY | Facility: CLINIC | Age: 63
End: 2018-11-09
Payer: COMMERCIAL

## 2018-11-16 ENCOUNTER — OFFICE VISIT (OUTPATIENT)
Dept: PHYSICAL THERAPY | Facility: CLINIC | Age: 63
End: 2018-11-16
Payer: COMMERCIAL

## 2018-11-16 DIAGNOSIS — Z96.612 HISTORY OF ARTHROPLASTY OF LEFT SHOULDER: Primary | ICD-10-CM

## 2018-11-16 DIAGNOSIS — M75.112 INCOMPLETE TEAR OF LEFT ROTATOR CUFF: ICD-10-CM

## 2018-11-16 DIAGNOSIS — M75.02 ADHESIVE CAPSULITIS OF LEFT SHOULDER: ICD-10-CM

## 2018-11-16 PROCEDURE — 97110 THERAPEUTIC EXERCISES: CPT

## 2018-11-16 PROCEDURE — 97140 MANUAL THERAPY 1/> REGIONS: CPT

## 2018-11-16 NOTE — PROGRESS NOTES
Daily Note     Today's date: 2018  Patient name: Winston Paul  : 1955  MRN: [id number]  Referring provider: Clif Park MD  Dx:   Encounter Diagnoses   Name Primary?  History of arthroplasty of left shoulder Yes    Incomplete tear of left rotator cuff     Adhesive capsulitis of left shoulder                   Subjective: Pt reports his shoulder feels very stiff because of the weather  States he was compliant with stretching while on vacation  Pain is currently rated 2/10  Objective: See treatment diary below    Precautions TSA rehab protocol    Specialty Daily Treatment Diary     Manual  11/2 11/16  10/19 10/26   PROM shoulder flex, abd and ER and elbow flex and ext 2x10 ea 2x10 ea  2x10 ea 2x10 ea   STM Retro to left hand  Retro to left hand   Retro to left hand  Retro to left hand    Supination stretch 10 sec, 1x10 10 sec, 1x10                          Exercise Diary          squeezes        Elbow flex and ext AROM    2x10 2x10   scaption 2x10 2x10   2x10   AAROM supine flexion   2x10   MA, 1x10   Row Yellow, 2x10 Yellow, 2x10      scap squeezes    2x10 2x10   Self PROM flexion    Hold 5, 2x5    Cane ER Hold 5, 2x10 Hold 5, 2x10  Hold 5, 2x10 Hold 5, 2x10   Cane abduction Hold 5, 2x10  Hold 5, 2x10  Hold 5, 3x5 Hold 5, 3x5   Tendon glides 1x10 ea 1x10 ea  1x10 ea 1x10 ea   Cane flexion Hold 5, 2x10  Hold 5, 2x10  Hold 5, 2x5 Hold 5, 2x5   RC isometrics    3 way, hold 5, 1x10 ea 3 way, hold 5, 1x10 ea   pulleys Flexion, 3 min Flexion, 3 min      Finger extension stretch Hold 10, 1x10                   Modalities        CP                            Assessment: Pt tolerated treatment well with minimal complaints of pain  Pt demonstrates increased tightness in shoulder today  Progressing well with shoulder strength, able to perform scaption to 80 degrees in standing  Plan: Progress treatment per surgical protocol

## 2018-11-23 ENCOUNTER — OFFICE VISIT (OUTPATIENT)
Dept: PHYSICAL THERAPY | Facility: CLINIC | Age: 63
End: 2018-11-23
Payer: COMMERCIAL

## 2018-11-23 DIAGNOSIS — Z96.612 HISTORY OF ARTHROPLASTY OF LEFT SHOULDER: Primary | ICD-10-CM

## 2018-11-23 DIAGNOSIS — M75.112 INCOMPLETE TEAR OF LEFT ROTATOR CUFF: ICD-10-CM

## 2018-11-23 DIAGNOSIS — M75.02 ADHESIVE CAPSULITIS OF LEFT SHOULDER: ICD-10-CM

## 2018-11-23 PROCEDURE — 97140 MANUAL THERAPY 1/> REGIONS: CPT

## 2018-11-23 PROCEDURE — G8985 CARRY GOAL STATUS: HCPCS

## 2018-11-23 PROCEDURE — G8984 CARRY CURRENT STATUS: HCPCS

## 2018-11-23 PROCEDURE — 97110 THERAPEUTIC EXERCISES: CPT

## 2018-11-23 NOTE — PROGRESS NOTES
PT Re-Evaluation     Today's date: 2018  Patient name: Maite Kumari  : 1955  MRN: [id number]  Referring provider: Fly Herrera MD  Dx:   Encounter Diagnosis     ICD-10-CM    1  History of arthroplasty of left shoulder Z96 612    2  Incomplete tear of left rotator cuff M75 112    3  Adhesive capsulitis of left shoulder M75 02                   Assessment  Assessment details: Maite Kumari is a 60 y o  male who has been seen in physical therapy for 9 visits secondary to the diagnosis of History of arthroplasty of left shoulder  (primary encounter diagnosis), Incomplete tear of left rotator cuff and Adhesive capsulitis of left shoulder and is making steady gains towards established goals  The patient has demonstrated decreased pain level, improved strength upper extremity, improved postural awareness, improved upper extremity ROM and reduced swelling  He has demonstrated improvements in function and has begun using the UE for self care and basic ADLs  He would benefit from continued PT services to address remaining impairments outlined in Progress Note and to maximize function     Impairments: abnormal or restricted ROM, abnormal movement, activity intolerance, impaired physical strength, lacks appropriate home exercise program, pain with function, scapular dyskinesis, poor posture  and poor body mechanics  Understanding of Dx/Px/POC: good   Prognosis: good    Goals  Short Term Goals (4-6 weeks)  Pt will be independent in basic Home Exercise Program -MET  Pt will demonstrate improved postural awareness with no cueing required from PT-MET  Pt will demonstrate improved left shoulder PROM TO 90 deg flex and abd, and ER to 45 deg- MET  Pt will report a decrease in resting pain no more than 2/10 -MET    Long Term Goals (8-10 weeks)  Pt will be independent in comprehensive Home Exercise Program -NOT MET  Pt will be able to reach in a cabinet at shoulder height-NOT MET  Pt will be independent in self care and basic ADLs -NOT MET  Pt will demonstrate improved strength in shoulder to at least 4/5-NOT MET      Plan  Plan details: 1-2 times per week  Patient would benefit from: skilled PT  Referral necessary: No  Planned modality interventions: cryotherapy and thermotherapy: hydrocollator packs  Planned therapy interventions: ADL retraining, body mechanics training, functional ROM exercises, home exercise program, graded exercise, joint mobilization, manual therapy, massage, neuromuscular re-education, patient education, postural training, strengthening, stretching, therapeutic activities, therapeutic exercise and therapeutic training  Frequency: 2x week  Duration in weeks: 8  Treatment plan discussed with: patient        Subjective Evaluation    History of Present Illness  Date of surgery: 2018  Mechanism of injury: Pt reports he feels like he is at 60%  Pt states that he feels his function is improving and is now able to dress himself, perform feeding, and self care activities  Reports he is able to do some reaching in front of him now  Pt reports difficulties reaching above shoulder height and has continued difficulties sleeping although improving  Pt states that he is now getting 2-3 hours of sleep at a time but wakes up with left wrist pain and tingling in hands  Continued difficulties reported with grasping objects in left hand secondary to pain and swelling  Shoulder pain and stiffness increase with cold weather     Pain  No pain reported  Current pain ratin  At best pain ratin  At worst pain ratin  Quality: tight and dull ache  Relieving factors: ice and medications  Aggravating factors: overhead activity  Progression: improved    Social Support    Employment status: working (AC and heating, lifting)  Hand dominance: left      Diagnostic Tests  X-ray: abnormal  MRI studies: abnormal  Treatments  Previous treatment: medication and physical therapy  Current treatment: physical therapy  Patient Goals  Patient goals for therapy: decreased pain, decreased edema, increased motion, increased strength, independence with ADLs/IADLs and return to work (ONGOING)          Objective     Postural Observations  Seated posture: good  Standing posture: fair  Correction of posture: makes symptoms better        Observations   Left Shoulder   Positive for edema (left hand), incision and spasms  Additional Observation Details  Incision left anterior shoulder clean, dry, intact  Minimal scar tissue present    Palpation   Left   No palpable tenderness to the biceps, middle deltoid and upper trapezius  Hypertonic in the pectoralis minor  Tenderness of the pectoralis minor  Tenderness     Left Shoulder   Tenderness in the acromion       Right Shoulder  No tenderness     Cervical/Thoracic Screen   Cervical range of motion within normal limits with the following exceptions: Bilateral rotation, left greater than right  Thoracic range of motion within normal limits with the following exceptions: Into extension    Neurological Testing     Sensation     Shoulder   Left Shoulder   Intact: light touch    Right Shoulder   Intact: light touch    Active Range of Motion   Left Shoulder   Flexion: 120 degrees with pain  Abduction: 90 degrees with pain  External rotation 0°: 45 degrees   Internal rotation 0°: 45 degrees     Right Shoulder   Flexion: 160 degrees   Abduction: 145 degrees   External rotation 45°: 60 degrees   Internal rotation 45°: 80 degrees     Additional Active Range of Motion Details  Elbow AROM right WNL, left -4-135 deg  Moderate restrictions in left wrist and finger extension and flexion AROM secondary to swelling    Scapular Mobility   Left Shoulder   Scapular mobility: poor    Strength/Myotome Testing     Left Shoulder     Planes of Motion   Flexion: 3+   Abduction: 3+   External rotation at 45°: 3+   Internal rotation at 45°: 3+     Right Shoulder     Planes of Motion   Flexion: 4+   Abduction: 4+ External rotation at 45°: 4+   Internal rotation at 45°: 4+       Flowsheet Rows      Most Recent Value   PT/OT G-Codes   Current Score  50   Projected Score  59   FOTO information reviewed  Yes   Assessment Type  Re-evaluation   G code set  Carrying, Moving & Handling Objects   Carrying, Moving and Handling Objects Current Status ()  CK   Carrying, Moving and Handling Objects Goal Status ()  CK          MedBridge access code: [email or web address]    Precautions TSA rehab protocol    Specialty Daily Treatment Diary     Manual  11/2 11/16 11/23  10/26   PROM shoulder flex, abd and ER and elbow flex and ext 2x10 ea 2x10 ea 2x10 ea  2x10 ea   STM Retro to left hand  Retro to left hand  Retro to left hand   Retro to left hand    Supination stretch 10 sec, 1x10 10 sec, 1x10 10 sec, 1x10     PROM   Left wrist and fingers     Median nerve glides   2x10         Exercise Diary          squeezes        Elbow flex and ext AROM     2x10   scaption 2x10 2x10   2x10   AAROM supine flexion   2x10 2x10  MA, 1x10   Row Yellow, 2x10 Yellow, 2x10 blue, 2x10     scap squeezes     2x10   Self PROM flexion        Cane ER Hold 5, 2x10 Hold 5, 2x10 Hold 5, 2x10  Hold 5, 2x10   Cane abduction Hold 5, 2x10  Hold 5, 2x10 Hold 5, 2x10  Hold 5, 3x5   Tendon glides 1x10 ea 1x10 ea 1x10 ea  1x10 ea   Cane flexion Hold 5, 2x10  Hold 5, 2x10 Hold 5, 2x10  Hold 5, 2x5   RC isometrics     3 way, hold 5, 1x10 ea   pulleys Flexion, 3 min Flexion, 3 min Flexion, 5 min     Finger extension stretch Hold 10, 1x10  Hold 10, x2     Prayer stretch   Hold 10, 1x10         Modalities        CP

## 2018-11-29 ENCOUNTER — TELEPHONE (OUTPATIENT)
Dept: OBGYN CLINIC | Facility: CLINIC | Age: 63
End: 2018-11-29

## 2018-11-29 NOTE — TELEPHONE ENCOUNTER
Gita Armstrong called asking if the patient needs to be pre-medicated before dental cleaning or any dental procedure

## 2018-11-30 ENCOUNTER — OFFICE VISIT (OUTPATIENT)
Dept: PHYSICAL THERAPY | Facility: CLINIC | Age: 63
End: 2018-11-30
Payer: COMMERCIAL

## 2018-11-30 DIAGNOSIS — Z96.612 HISTORY OF ARTHROPLASTY OF LEFT SHOULDER: Primary | ICD-10-CM

## 2018-11-30 DIAGNOSIS — M75.02 ADHESIVE CAPSULITIS OF LEFT SHOULDER: ICD-10-CM

## 2018-11-30 DIAGNOSIS — M75.112 INCOMPLETE TEAR OF LEFT ROTATOR CUFF: ICD-10-CM

## 2018-11-30 PROCEDURE — 97140 MANUAL THERAPY 1/> REGIONS: CPT

## 2018-11-30 PROCEDURE — 97110 THERAPEUTIC EXERCISES: CPT

## 2018-11-30 NOTE — PROGRESS NOTES
Daily Note     Today's date: 2018  Patient name: Adamaris Cristobal  : 1955  MRN: [id number]  Referring provider: Geoff Nunes MD  Dx:   Encounter Diagnoses   Name Primary?  History of arthroplasty of left shoulder Yes    Incomplete tear of left rotator cuff     Adhesive capsulitis of left shoulder            Subjective: Pt has 3/10 pain and reports that the weather is causing him to feel stiff today  He has been performing his HEP regularly  Objective: See treatment diary below    Precautions TSA rehab protocol    Specialty Daily Treatment Diary     Manual      PROM shoulder flex, abd and ER and elbow flex and ext 2x10 ea 2x10 ea 2x10 ea 2x10 ea    STM Retro to left hand  Retro to left hand  Retro to left hand      Supination stretch 10 sec, 1x10 10 sec, 1x10 10 sec, 1x10 10 sec, 1x10    PROM   Left wrist and fingers Left wrist and fingers    Median nerve glides   2x10 2x10        Exercise Diary          squeezes        Elbow flex and ext AROM        scaption 2x10 2x10  2x15    AAROM supine flexion   2x10 2x10 2x10    Row Yellow, 2x10 Yellow, 2x10 blue, 2x10     scap squeezes        Self PROM flexion        Cane ER Hold 5, 2x10 Hold 5, 2x10 Hold 5, 2x10 Hold 5, 2x10    Cane abduction Hold 5, 2x10  Hold 5, 2x10 Hold 5, 2x10     Tendon glides 1x10 ea 1x10 ea 1x10 ea     Cane flexion Hold 5, 2x10  Hold 5, 2x10 Hold 5, 2x10 Hold 5, 2x10    RC isometrics        pulleys Flexion, 3 min Flexion, 3 min Flexion, 5 min Flexion, 5 min    Finger extension stretch Hold 10, 1x10  Hold 10, x2     Prayer stretch   Hold 10, 1x10 Hold 10, 1x10        Modalities        CP                            Assessment: Pt tolerated treatment well with minimal complaints of pain  Pt has increased shoulder stiffness and continues to have hand swelling  PROM improved with manual treatment and stretching  Pt was able to perform independent scaption to 100 deg        Plan: Progress treatment per surgical protocol  Patient was co-treated by LOS Steinberg under my direct supervision

## 2018-12-04 ENCOUNTER — OFFICE VISIT (OUTPATIENT)
Dept: OBGYN CLINIC | Facility: CLINIC | Age: 63
End: 2018-12-04
Payer: COMMERCIAL

## 2018-12-04 VITALS
SYSTOLIC BLOOD PRESSURE: 131 MMHG | BODY MASS INDEX: 31.32 KG/M2 | DIASTOLIC BLOOD PRESSURE: 80 MMHG | HEART RATE: 71 BPM | WEIGHT: 188 LBS | HEIGHT: 65 IN

## 2018-12-04 DIAGNOSIS — M19.019 SHOULDER ARTHRITIS: Primary | ICD-10-CM

## 2018-12-04 PROCEDURE — 99024 POSTOP FOLLOW-UP VISIT: CPT | Performed by: ORTHOPAEDIC SURGERY

## 2018-12-07 ENCOUNTER — OFFICE VISIT (OUTPATIENT)
Dept: PHYSICAL THERAPY | Facility: CLINIC | Age: 63
End: 2018-12-07
Payer: COMMERCIAL

## 2018-12-07 DIAGNOSIS — M75.02 ADHESIVE CAPSULITIS OF LEFT SHOULDER: ICD-10-CM

## 2018-12-07 DIAGNOSIS — Z96.612 HISTORY OF ARTHROPLASTY OF LEFT SHOULDER: Primary | ICD-10-CM

## 2018-12-07 DIAGNOSIS — M75.112 INCOMPLETE TEAR OF LEFT ROTATOR CUFF: ICD-10-CM

## 2018-12-07 PROCEDURE — 97140 MANUAL THERAPY 1/> REGIONS: CPT

## 2018-12-07 PROCEDURE — 97110 THERAPEUTIC EXERCISES: CPT

## 2018-12-07 NOTE — PROGRESS NOTES
Daily Note     Today's date: 2018  Patient name: Mone Cox  : 1955  MRN: [id number]  Referring provider: Luis Felipe Cruz MD  Dx:   Encounter Diagnosis     ICD-10-CM    1  History of arthroplasty of left shoulder Z96 612    2  Incomplete tear of left rotator cuff M75 112    3  Adhesive capsulitis of left shoulder M75 02                   Subjective: Pt states that he is feeling 0/10 pain today, however, states that the cold temperature at work is contributing to his tightness today         Objective: See treatment diary below    Precautions TSA rehab protocol    Specialty Daily Treatment Diary     Manual     PROM shoulder flex, abd and ER and elbow flex and ext 2x10 ea 2x10 ea 2x10 ea 2x10 ea 2x10 ea   STM Retro to left hand  Retro to left hand  Retro to left hand      Supination stretch 10 sec, 1x10 10 sec, 1x10 10 sec, 1x10 10 sec, 1x10 10 sec, 1x10   PROM   Left wrist and fingers Left wrist and fingers Left wrist and fingers   Median nerve glides   2x10 2x10 2x10       Exercise Diary          squeezes        Elbow flex and ext AROM        scaption 2x10 2x10  2x15    AAROM supine flexion   2x10 2x10 2x10    Row Yellow, 2x10 Yellow, 2x10 blue, 2x10     scap squeezes        Self PROM flexion        Cane ER Hold 5, 2x10 Hold 5, 2x10 Hold 5, 2x10 Hold 5, 2x10 Hold 5, 2x10   Cane abduction Hold 5, 2x10  Hold 5, 2x10 Hold 5, 2x10  Hold 5, 2x10   Tendon glides 1x10 ea 1x10 ea 1x10 ea     Cane flexion Hold 5, 2x10  Hold 5, 2x10 Hold 5, 2x10 Hold 5, 2x10 Hold 5, 2x10   RC isometrics        pulleys Flexion, 3 min Flexion, 3 min Flexion, 5 min Flexion, 5 min Flexion, 5 min   Finger extension stretch Hold 10, 1x10  Hold 10, x2     Functional Reaching to [employer], 1x15   Prayer stretch   Hold 10, 1x10 Hold 10, 1x10 Hold 10, 1x10       Modalities        CP                            Assessment: Pt tolerated treatment well with minimal complaints of pain and was educated on importance of performing HEP secondary to him stating that he hasn't been as compliant to his HEP as he should  Pt demonstrated understanding  He presents with decreased hand swelling and increased shoulder stiffness and tremors  PROM was improved with stretching and patient was advised to consult PCP due to the increase in frequency of tremors  Plan: Continue with plan of care to improve mobility, strength, and function  Progress treatment per surgical protocol  Patient co-treated by LOS Gordon under my supervision

## 2018-12-14 ENCOUNTER — OFFICE VISIT (OUTPATIENT)
Dept: PHYSICAL THERAPY | Facility: CLINIC | Age: 63
End: 2018-12-14
Payer: COMMERCIAL

## 2018-12-14 DIAGNOSIS — M75.112 INCOMPLETE TEAR OF LEFT ROTATOR CUFF: ICD-10-CM

## 2018-12-14 DIAGNOSIS — Z96.612 HISTORY OF ARTHROPLASTY OF LEFT SHOULDER: Primary | ICD-10-CM

## 2018-12-14 DIAGNOSIS — M75.02 ADHESIVE CAPSULITIS OF LEFT SHOULDER: ICD-10-CM

## 2018-12-14 PROCEDURE — 97140 MANUAL THERAPY 1/> REGIONS: CPT

## 2018-12-14 PROCEDURE — 97110 THERAPEUTIC EXERCISES: CPT

## 2018-12-14 NOTE — PROGRESS NOTES
Daily Note     Today's date: 2018  Patient name: Jody Hinson  : 1955  MRN: [id number]  Referring provider: Sandra Whaley MD  Dx:   Encounter Diagnoses   Name Primary?  History of arthroplasty of left shoulder Yes    Incomplete tear of left rotator cuff     Adhesive capsulitis of left shoulder                   Subjective: Pt reports is shoulder feels stiff  Continued difficulties sleeping and laying flat on the bed  States that he occasionally getting tingling and numbness in 3rd and 4th fingers  Objective: See treatment diary below    Precautions TSA rehab protocol    Specialty Daily Treatment Diary     Manual     PROM shoulder flex, abd and ER and elbow flex and ext 2x10 ea  2x10 ea 2x10 ea 2x10 ea   STM   Retro to left hand      Supination stretch 10 sec, 1x10  10 sec, 1x10 10 sec, 1x10 10 sec, 1x10   PROM Left wrist and fingers  Left wrist and fingers Left wrist and fingers Left wrist and fingers   Median nerve glides 2x10  2x10 2x10 2x10       Exercise Diary          squeezes        Elbow flex and ext AROM        scaption 2x10   2x15    AAROM supine flexion    2x10 2x10    Row   blue, 2x10     Cane ER Hold 5, 2x10  Hold 5, 2x10 Hold 5, 2x10 Hold 5, 2x10   Cane abduction Hold 5, 2x10   Hold 5, 2x10  Hold 5, 2x10   Tendon glides   1x10 ea     Cane flexion Hold 5, 2x10   Hold 5, 2x10 Hold 5, 2x10 Hold 5, 2x10   RC isometrics        pulleys Flexion, 3 min  Flexion, 5 min Flexion, 5 min Flexion, 5 min   Finger extension stretch Hold 10, 1x10  Hold 10, x2     Functional Reaching to [facility] [employer], 1x15    1lb, 1x15   Prayer stretch Hold 10, 1x10  Hold 10, 1x10 Hold 10, 1x10 Hold 10, 1x10       Modalities        CP                            Assessment: Pt tolerated treatment well with minimal complaints of pain   Pt demonstrating improved shoulder functional reach, able to reach first shelf in the clinic  (+) ULTT left median distribution, given nerve glides for HEP, demonstrates understanding  Plan: Progress treatment per surgical protocol

## 2018-12-21 ENCOUNTER — APPOINTMENT (OUTPATIENT)
Dept: PHYSICAL THERAPY | Facility: CLINIC | Age: 63
End: 2018-12-21
Payer: COMMERCIAL

## 2018-12-28 ENCOUNTER — OFFICE VISIT (OUTPATIENT)
Dept: PHYSICAL THERAPY | Facility: CLINIC | Age: 63
End: 2018-12-28
Payer: COMMERCIAL

## 2018-12-28 DIAGNOSIS — M75.02 ADHESIVE CAPSULITIS OF LEFT SHOULDER: ICD-10-CM

## 2018-12-28 DIAGNOSIS — Z96.612 HISTORY OF ARTHROPLASTY OF LEFT SHOULDER: Primary | ICD-10-CM

## 2018-12-28 DIAGNOSIS — M75.112 INCOMPLETE TEAR OF LEFT ROTATOR CUFF: ICD-10-CM

## 2018-12-28 PROCEDURE — G8984 CARRY CURRENT STATUS: HCPCS

## 2018-12-28 PROCEDURE — G8985 CARRY GOAL STATUS: HCPCS

## 2018-12-28 PROCEDURE — 97110 THERAPEUTIC EXERCISES: CPT

## 2018-12-28 PROCEDURE — 97140 MANUAL THERAPY 1/> REGIONS: CPT

## 2018-12-28 NOTE — PROGRESS NOTES
PT Re-Evaluation     Today's date: 2018  Patient name: Ramón Culver  : 1955  MRN: [id number]  Referring provider: Eric Cho MD  Dx:   Encounter Diagnosis     ICD-10-CM    1  History of arthroplasty of left shoulder Z96 612    2  Incomplete tear of left rotator cuff M75 112    3  Adhesive capsulitis of left shoulder M75 02                   Assessment  Assessment details: Ramón Culver is a 60 y o  male who has been seen in physical therapy for 13 visits secondary to the diagnosis of History of arthroplasty of left shoulder  (primary encounter diagnosis), Incomplete tear of left rotator cuff and Adhesive capsulitis of left shoulder and is making steady gains towards established goals  The patient has demonstrated decreased pain level, improved strength upper extremity, improved postural awareness, improved upper extremity ROM and reduced swelling  He continues to demonstrate increasing function in left UE  He would benefit from continued PT services to address remaining impairments outlined in Progress Note and to maximize function     Impairments: abnormal or restricted ROM, abnormal movement, activity intolerance, impaired physical strength, lacks appropriate home exercise program, pain with function, scapular dyskinesis, poor posture  and poor body mechanics  Understanding of Dx/Px/POC: good   Prognosis: good    Goals  Short Term Goals (4-6 weeks)  Pt will be independent in basic Home Exercise Program -MET  Pt will demonstrate improved postural awareness with no cueing required from PT-MET  Pt will demonstrate improved left shoulder PROM TO 90 deg flex and abd, and ER to 45 deg- MET  Pt will report a decrease in resting pain no more than 2/10 -MET    Long Term Goals (8-10 weeks)  Pt will be independent in comprehensive Home Exercise Program -NOT MET  Pt will be able to reach in a cabinet at shoulder height-MET  Pt will be independent in self care and basic ADLs -Partially MET  Pt will demonstrate improved strength in shoulder to at least 4/5- Partially MET      Plan  Plan details: 1-2 times per week  Patient would benefit from: skilled PT  Referral necessary: No  Planned modality interventions: cryotherapy and thermotherapy: hydrocollator packs  Planned therapy interventions: ADL retraining, body mechanics training, functional ROM exercises, home exercise program, graded exercise, joint mobilization, manual therapy, massage, neuromuscular re-education, patient education, postural training, strengthening, stretching, therapeutic activities, therapeutic exercise and therapeutic training  Frequency: 2x week  Duration in weeks: 8  Treatment plan discussed with: patient        Subjective Evaluation    History of Present Illness  Date of surgery: 2018  Mechanism of injury: Pt reports he feels like he is at 70%  States that he has seen significant improvements in shoulder ROM and strength this past month  Pt reports continued difficulties buttoning shirts and pants and donning jacket, requires help from wife  Reports improvements with reaching using his shoulder for light activities around work  Continues to avoid heavy lifting/carrying  Pt with complaints of difficulties sleeping through the night and wakes up every 3 5 hours secondary to wrist pain     Pain  No pain reported  Current pain ratin  At best pain ratin  At worst pain rating: 3  Location: left shoulder  Quality: tight and dull ache  Relieving factors: ice and medications  Aggravating factors: overhead activity and lifting  Progression: improved    Social Support    Employment status: working (AC and heating, lifting)  Hand dominance: left      Diagnostic Tests  X-ray: abnormal  MRI studies: abnormal  Treatments  Previous treatment: medication and physical therapy  Current treatment: physical therapy  Patient Goals  Patient goals for therapy: decreased pain, decreased edema, increased motion, increased strength, independence with ADLs/IADLs and return to work (ONGOING)          Objective     Postural Observations  Seated posture: good  Standing posture: fair  Correction of posture: makes symptoms better        Observations   Left Shoulder   Positive for edema (left hand), incision and spasms  Additional Observation Details  Incision left anterior shoulder clean, dry, intact  Minimal scar tissue present    Palpation   Left   No palpable tenderness to the biceps, middle deltoid and upper trapezius  Hypertonic in the pectoralis minor  Tenderness of the pectoralis minor  Tenderness     Left Shoulder   Tenderness in the acromion       Right Shoulder  No tenderness     Cervical/Thoracic Screen   Cervical range of motion within normal limits with the following exceptions: Bilateral rotation, left greater than right  Thoracic range of motion within normal limits with the following exceptions: Into extension    Neurological Testing     Sensation     Shoulder   Left Shoulder   Intact: light touch    Right Shoulder   Intact: light touch    Active Range of Motion   Left Shoulder   Flexion: 135 degrees   Abduction: 110 degrees   External rotation 0°: 65 degrees   Internal rotation 45°: 55 degrees     Right Shoulder   Flexion: 160 degrees   Abduction: 145 degrees   External rotation 45°: 60 degrees   Internal rotation 45°: 80 degrees     Additional Active Range of Motion Details  Elbow AROM right WNL, left -2-135 deg  Moderate restrictions in left wrist and finger extension and flexion AROM secondary to swelling    Scapular Mobility   Left Shoulder   Scapular mobility: poor    Strength/Myotome Testing     Left Shoulder     Planes of Motion   Flexion: 4   Abduction: 4-   External rotation at 45°: 4   Internal rotation at 45°: 4-     Right Shoulder     Planes of Motion   Flexion: 4+   Abduction: 4+   External rotation at 45°: 4+   Internal rotation at 45°: 4+       Flowsheet Rows      Most Recent Value   PT/OT G-Codes   Current Score  57 Projected Score  59   FOTO information reviewed  Yes   Assessment Type  Re-evaluation   G code set  Carrying, Moving & Handling Objects   Carrying, Moving and Handling Objects Current Status ()  CK   Carrying, Moving and Handling Objects Goal Status ()  CK          Player X access code: [email or web address]    Precautions TSA rehab protocol    Specialty Daily Treatment Diary     Manual  12/14 12/28 11/30 12/7   PROM shoulder flex, abd and ER and elbow flex and ext 2x10 ea 2x10 ea  2x10 ea 2x10 ea   STM        Supination stretch 10 sec, 1x10 10 sec, 1x10  10 sec, 1x10 10 sec, 1x10   PROM Left wrist and fingers Left wrist and fingers  Left wrist and fingers Left wrist and fingers   Median nerve glides 2x10 2x10  2x10 2x10       Exercise Diary         scaption 2x10 2x10  2x15    AAROM supine flexion     2x10    Row        Cane ER Hold 5, 2x10 Hold 5, 2x10  Hold 5, 2x10 Hold 5, 2x10   Cane abduction Hold 5, 2x10  Hold 5, 2x10   Hold 5, 2x10   Tendon glides        Cane flexion Hold 5, 2x10  Hold 5, 2x10  Hold 5, 2x10 Hold 5, 2x10   RC isometrics        pulleys Flexion, 3 min Flexion, 3 min  Flexion, 5 min Flexion, 5 min   Finger extension stretch Hold 10, 1x10       Functional Reaching to Cabinet 1lb, 1x15 1lb, 1x15   1lb, 1x15   Prayer stretch Hold 10, 1x10 Hold 10, 1x10  Hold 10, 1x10 Hold 10, 1x10       Modalities        CP

## 2019-01-04 ENCOUNTER — OFFICE VISIT (OUTPATIENT)
Dept: PHYSICAL THERAPY | Facility: CLINIC | Age: 64
End: 2019-01-04
Payer: COMMERCIAL

## 2019-01-04 DIAGNOSIS — M75.02 ADHESIVE CAPSULITIS OF LEFT SHOULDER: ICD-10-CM

## 2019-01-04 DIAGNOSIS — M75.112 INCOMPLETE TEAR OF LEFT ROTATOR CUFF: ICD-10-CM

## 2019-01-04 DIAGNOSIS — Z96.612 HISTORY OF ARTHROPLASTY OF LEFT SHOULDER: Primary | ICD-10-CM

## 2019-01-04 PROCEDURE — 97140 MANUAL THERAPY 1/> REGIONS: CPT

## 2019-01-04 PROCEDURE — 97110 THERAPEUTIC EXERCISES: CPT

## 2019-01-04 NOTE — PROGRESS NOTES
Daily Note     Today's date: 2019  Patient name: Melba Concepcion  : 1955  MRN: [id number]  Referring provider: Shannan Singletary MD  Dx:   Encounter Diagnosis     ICD-10-CM    1  History of arthroplasty of left shoulder Z96 612    2  Incomplete tear of left rotator cuff M75 112    3  Adhesive capsulitis of left shoulder M75 02                   Subjective: Pt feels 0/10 in the shoulder walking in today  Pt states that he continues to wake up in the middle of the night do to the tingling in his hand and does not stretch regularly as he should  Objective: See treatment diary below    Precautions TSA rehab protocol    Specialty Daily Treatment Diary     Manual     PROM shoulder flex, abd and ER and elbow flex and ext 2x10 ea 2x10 ea 2x10 ea  2x10 ea   STM        Supination stretch 10 sec, 1x10 10 sec, 1x10 10 sec, 1x10  10 sec, 1x10   PROM Left wrist and fingers Left wrist and fingers Left wrist and fingers  Left wrist and fingers   Median nerve glides 2x10 2x10 2x10  2x10       Exercise Diary         scaption 2x10 2x10 2x10     AAROM supine flexion         Row        Cane ER Hold 5, 2x10 Hold 5, 2x10 Hold 5, 2x10  Hold 5, 2x10   Cane abduction Hold 5, 2x10  Hold 5, 2x10 Hold 5, 2x10  Hold 5, 2x10   Tendon glides        Cane flexion Hold 5, 2x10  Hold 5, 2x10 Hold 5, 2x10  Hold 5, 2x10   RC isometrics        pulleys Flexion, 3 min Flexion, 3 min Flexion, 3 min  Flexion, 5 min   Finger extension stretch Hold 10, 1x10       Functional Reaching to Cabinet 1lb, 1x15 1lb, 1x15 1 lb, 1x15  1lb, 1x15   Prayer stretch Hold 10, 1x10 Hold 10, 1x10 Hold 10, 1x10  Hold 10, 1x10       Modalities        CP                            Assessment: Pt tolerated treatment well with no complaints of pain  Pt presents with stiffness in shoulder which is relieved with some manual stretching   Pt was educated on how important it is for him to continue his HEP and stretching to prevent further stiffening and swelling of the shoulder/hand  Plan: Continue with plan of care to improve mobility, strength, and function  Progress treatment per surgical protocol  Patient co-treated by LOS Thompson under my supervision

## 2019-01-11 ENCOUNTER — OFFICE VISIT (OUTPATIENT)
Dept: PHYSICAL THERAPY | Facility: CLINIC | Age: 64
End: 2019-01-11
Payer: COMMERCIAL

## 2019-01-11 DIAGNOSIS — M75.112 INCOMPLETE TEAR OF LEFT ROTATOR CUFF: ICD-10-CM

## 2019-01-11 DIAGNOSIS — Z96.612 HISTORY OF ARTHROPLASTY OF LEFT SHOULDER: Primary | ICD-10-CM

## 2019-01-11 DIAGNOSIS — M75.02 ADHESIVE CAPSULITIS OF LEFT SHOULDER: ICD-10-CM

## 2019-01-11 PROCEDURE — 97140 MANUAL THERAPY 1/> REGIONS: CPT

## 2019-01-11 PROCEDURE — 97110 THERAPEUTIC EXERCISES: CPT

## 2019-01-11 NOTE — PROGRESS NOTES
Daily Note     Today's date: 2019  Patient name: Winston Paul  : 1955  MRN: [id number]  Referring provider: Clif Park MD  Dx:   Encounter Diagnosis     ICD-10-CM    1  History of arthroplasty of left shoulder Z96 612    2  Incomplete tear of left rotator cuff M75 112    3  Adhesive capsulitis of left shoulder M75 02                   Subjective: Pt feels 0/10 pain in the shoulder walking in today  He states that he feels very stiff due to the weather and states that he continues to ocassionally wake up at night due to the numbness and tingling in his L hand  Objective: See treatment diary below    Precautions TSA rehab protocol    Specialty Daily Treatment Diary     Manual      PROM shoulder flex, abd and ER and elbow flex and ext 2x10 ea 2x10 ea 2x10 ea 2x10 ea    STM        Supination stretch 10 sec, 1x10 10 sec, 1x10 10 sec, 1x10 10 sec, 1x10    PROM Left wrist and fingers Left wrist and fingers Left wrist and fingers Left wrist and fingers    Median nerve glides 2x10 2x10 2x10 2x10        Exercise Diary         scaption 2x10 2x10 2x10 2x10    AAROM supine flexion         Row        Cane ER Hold 5, 2x10 Hold 5, 2x10 Hold 5, 2x10 Hold 5, 2x10    Cane abduction Hold 5, 2x10  Hold 5, 2x10 Hold 5, 2x10 Hold 5, 2x10    Tendon glides        Cane flexion Hold 5, 2x10  Hold 5, 2x10 Hold 5, 2x10 Hold 5, 2x10    RC isometrics        pulleys Flexion, 3 min Flexion, 3 min Flexion, 3 min Flexion, 3 min    Finger extension stretch Hold 10, 1x10       Functional Reaching to Cabinet 1lb, 1x15 1lb, 1x15 1 lb, 1x15 1 lb, 1x15    Flex Thera-bar     [place]roward/backward turning 2x30 sec ea    Prayer stretch Hold 10, 1x10 Hold 10, 1x10 Hold 10, 1x10 Hold 10, 1x10        Modalities        CP                            Assessment: Pt tolerated treatment well with no complaints of pain  He presents with tightness in his shoulder which is relieved with manual stretching   Pt has decreased swelling in hand/fingers and is almost able to fully flex his digits  Pt shows improved strength with his UE by being able to use a 1lb weight when performing his cabinet reaches  Plan: Continue with plan of care to improve mobility, strength, and function  Progress treatment per surgical protocol  Patient co-treated by LOS Kemp under my supervision

## 2019-01-18 ENCOUNTER — OFFICE VISIT (OUTPATIENT)
Dept: PHYSICAL THERAPY | Facility: CLINIC | Age: 64
End: 2019-01-18
Payer: COMMERCIAL

## 2019-01-18 DIAGNOSIS — Z96.612 HISTORY OF ARTHROPLASTY OF LEFT SHOULDER: Primary | ICD-10-CM

## 2019-01-18 DIAGNOSIS — M75.112 INCOMPLETE TEAR OF LEFT ROTATOR CUFF: ICD-10-CM

## 2019-01-18 DIAGNOSIS — M75.02 ADHESIVE CAPSULITIS OF LEFT SHOULDER: ICD-10-CM

## 2019-01-18 PROCEDURE — 97110 THERAPEUTIC EXERCISES: CPT

## 2019-01-18 PROCEDURE — 97140 MANUAL THERAPY 1/> REGIONS: CPT

## 2019-01-18 NOTE — PROGRESS NOTES
Daily Note     Today's date: 2019  Patient name: Winston Paul  : 1955  MRN: [id number]  Referring provider: Clif Park MD  Dx:   Encounter Diagnoses   Name Primary?  History of arthroplasty of left shoulder Yes    Incomplete tear of left rotator cuff     Adhesive capsulitis of left shoulder                   Subjective: Pt reports that his shoulder has been feeling very stiff because of the snow  States he still wakes up 2-3 times a night because of the pain in his hands  Objective: See treatment diary below    Precautions TSA rehab protocol    Specialty Daily Treatment Diary     Manual     PROM shoulder flex, abd and ER and elbow flex and ext 2x10 ea 2x10 ea 2x10 ea 2x10 ea 2x10 ea   STM        Supination stretch 10 sec, 1x10 10 sec, 1x10 10 sec, 1x10 10 sec, 1x10 10 sec, 1x10   PROM Left wrist and fingers Left wrist and fingers Left wrist and fingers Left wrist and fingers Left wrist and fingers   Median nerve glides 2x10 2x10 2x10 2x10 2x10       Exercise Diary         scaption 2x10 2x10 2x10 2x10 2x10   AAROM supine flexion         Row        Cane ER Hold 5, 2x10 Hold 5, 2x10 Hold 5, 2x10 Hold 5, 2x10 Hold 5, 2x10   Cane abduction Hold 5, 2x10  Hold 5, 2x10 Hold 5, 2x10 Hold 5, 2x10 Hold 5, 2x10   Supine flexion     2 lbs, 1x15   Cane flexion Hold 5, 2x10  Hold 5, 2x10 Hold 5, 2x10 Hold 5, 2x10 Hold 5, 2x10   RC isometrics        pulleys Flexion, 3 min Flexion, 3 min Flexion, 3 min Flexion, Hold 10, 1x103 min Flexion, 3 min   Finger extension stretch Hold 10, 1x10       Functional Reaching to Cabinet 1lb, 1x15 1lb, 1x15 1 lb, 1x15 1 lb, 1x15 1 lb, 1x15   Flex Thera-bar     [place]roward/backward turning 2x30 sec ea Forward/backward turning 2x30 sec ea   Prayer stretch Hold 10, 1x10 Hold 10, 1x10 Hold 10, 1x10 Hold 10, 1x10 Hold 10, 1x10       Modalities        CP                            Assessment: Pt denies any pain with treatment and tolerated well   Pt presents with decreased swelling in hands  Continued (+) ULTT in median distribution  Pt demonstrating improved strength in shoulder, able to place 1 lb weight in overhead cabinet  Plan: Continue with plan of care to decrease pain, improve mobility, strength, and function

## 2019-01-22 ENCOUNTER — OFFICE VISIT (OUTPATIENT)
Dept: OBGYN CLINIC | Facility: CLINIC | Age: 64
End: 2019-01-22
Payer: COMMERCIAL

## 2019-01-22 VITALS
BODY MASS INDEX: 29.99 KG/M2 | DIASTOLIC BLOOD PRESSURE: 72 MMHG | HEART RATE: 81 BPM | HEIGHT: 65 IN | SYSTOLIC BLOOD PRESSURE: 136 MMHG | WEIGHT: 180 LBS

## 2019-01-22 DIAGNOSIS — M19.019 SHOULDER ARTHRITIS: ICD-10-CM

## 2019-01-22 DIAGNOSIS — Z96.612 STATUS POST TOTAL SHOULDER ARTHROPLASTY, LEFT: Primary | ICD-10-CM

## 2019-01-22 PROCEDURE — 99213 OFFICE O/P EST LOW 20 MIN: CPT | Performed by: ORTHOPAEDIC SURGERY

## 2019-01-22 NOTE — PROGRESS NOTES
Assessment/Plan:  1  Status post total shoulder arthroplasty, left     2  Shoulder arthritis         Scribe Attestation    I,:   Aleena Abdullahi am acting as a scribe while in the presence of the attending physician :        I,:   Yanci Mckenna MD personally performed the services described in this documentation    as scribed in my presence :            I am pleased with Doris Lawson' progress  He does demonstrate improved range of motion and good strength on exam today  I encouraged him to continue with his home exercise program and explained that I expect him to continue to improve with time  He understands that it can take a full year for recovery from this type of procedure  I do expect that the numbness and tingling in his hand will also improve as his swelling decreases  Doris Lawson will follow up with us on an as-needed basis  Subjective:   Kirstin Aviles is a 58 y o  male who presents today for follow-up evaluation now five months status post left shoulder total shoulder arthroplasty  He has been participating in physical therapy since we saw him last and states that he has continued to see slow improvement  He does note improving range of motion about his shoulder  He he does not complain of any significant pain and discomfort about the shoulder  He does state today that he has felt stiff recently with the cold weather  He does complain of numbness and tingling into his hand  He denies any new injury or trauma  He will be leaving shortly for [place] for approximately six weeks  Review of Systems   Constitutional: Negative for chills, fever and unexpected weight change  HENT: Negative for hearing loss, nosebleeds and sore throat  Eyes: Negative for pain, redness and visual disturbance  Respiratory: Negative for cough, shortness of breath and wheezing  Cardiovascular: Negative for chest pain, palpitations and leg swelling     Gastrointestinal: Negative for abdominal pain, nausea and vomiting  Endocrine: Negative for polyphagia and polyuria  Genitourinary: Negative for dysuria and hematuria  Musculoskeletal: Positive for joint swelling  Negative for arthralgias and myalgias  See HPI   Skin: Negative for rash and wound  Neurological: Positive for numbness  Negative for dizziness and headaches  Psychiatric/Behavioral: Negative for decreased concentration and suicidal ideas  The patient is not nervous/anxious  Past Medical History:   Diagnosis Date    Arthritis     Diabetes ([address] )     Tremor     on left arm - told due to shoulder problem       Past Surgical History:   Procedure Laterality Date    COLONOSCOPY      CYST REMOVAL      TN RECONSTR TOTAL SHOULDER IMPLANT Left 9/6/2018    Procedure: TOTAL SHOULDER ARTHROPLASTY;  Surgeon: Timo De La Cruz MD;  Location: [address] [place] [address];  Service: Orthopedics    UNDESCENDED TESTICLE EXPLORATION         History reviewed  No pertinent family history  Social History     Occupational History    Not on file  Social History Main Topics    Smoking status: Never Smoker    Smokeless tobacco: Never Used    Alcohol use Yes      Comment: rarely    Drug use: No    Sexual activity: Not on file         Current Outpatient Prescriptions:     lisinopril (ZESTRIL) 10 mg tablet, 10 mg every evening  , Disp: , Rfl: 1    metFORMIN (GLUCOPHAGE-XR) 750 mg 24 hr tablet, 750 mg daily with breakfast  , Disp: , Rfl: 5    Allergies   Allergen Reactions    Other      Cats       Objective:  Vitals:    01/22/19 0826   BP: 136/72   Pulse: 81       Left Shoulder Exam     Range of Motion   Active Abduction: 90   Internal Rotation 0 degrees: Sacrum     Muscle Strength   Abduction: 4/5   Internal Rotation: 5/5   External Rotation: 5/5     Other   Erythema: absent  Scars: present  Sensation: decreased (tingling in hand)  Pulse: present             Physical Exam   Constitutional: He is oriented to person, place, and time   He appears well-developed and well-nourished  HENT:   Head: Normocephalic and atraumatic  Eyes: Pupils are equal, round, and reactive to light  Conjunctivae are normal    Neck: Normal range of motion  Neck supple  Cardiovascular: Normal rate and intact distal pulses  Pulmonary/Chest: Effort normal  No respiratory distress  Musculoskeletal:   As noted in HPI   Neurological: He is alert and oriented to person, place, and time  Skin: Skin is warm and dry  Psychiatric: He has a normal mood and affect  His behavior is normal    Vitals reviewed  I have personally reviewed pertinent films in PACS and my interpretation is as follows: No imaging reviewed with the patient

## 2019-01-25 ENCOUNTER — OFFICE VISIT (OUTPATIENT)
Dept: PHYSICAL THERAPY | Facility: CLINIC | Age: 64
End: 2019-01-25
Payer: COMMERCIAL

## 2019-01-25 DIAGNOSIS — Z96.612 HISTORY OF ARTHROPLASTY OF LEFT SHOULDER: Primary | ICD-10-CM

## 2019-01-25 DIAGNOSIS — M75.02 ADHESIVE CAPSULITIS OF LEFT SHOULDER: ICD-10-CM

## 2019-01-25 DIAGNOSIS — M75.112 INCOMPLETE TEAR OF LEFT ROTATOR CUFF: ICD-10-CM

## 2019-01-25 PROCEDURE — 97140 MANUAL THERAPY 1/> REGIONS: CPT

## 2019-01-25 PROCEDURE — 97110 THERAPEUTIC EXERCISES: CPT

## 2019-01-25 PROCEDURE — G8985 CARRY GOAL STATUS: HCPCS

## 2019-01-25 PROCEDURE — G8984 CARRY CURRENT STATUS: HCPCS

## 2019-01-25 NOTE — PROGRESS NOTES
PT Discharge    Today's date: 2019  Patient name: Mor Jackman  : 1955  MRN: [id number]  Referring provider: Carito Smith MD  Dx:   Encounter Diagnosis     ICD-10-CM    1  History of arthroplasty of left shoulder Z96 612    2  Incomplete tear of left rotator cuff M75 112    3  Adhesive capsulitis of left shoulder M75 02                   Assessment  Assessment details: Mor Jackman is a 60 y o  male who has been seen in physical therapy for 17 visits secondary to the diagnosis of History of arthroplasty of left shoulder  (primary encounter diagnosis), Incomplete tear of left rotator cuff and Adhesive capsulitis of left shoulder and is making steady gains towards established goals  The patient has demonstrated decreased pain level, improved strength upper extremity, improved postural awareness, improved upper extremity ROM and reduced swelling in hand  Pt is leaving for [place] for 6 weeks  Pt will call when he returns and inform of decision whether he will continue with PT services or transition to [facility]    Impairments: abnormal or restricted ROM, abnormal movement, activity intolerance, impaired physical strength, lacks appropriate home exercise program, pain with function, scapular dyskinesis, poor posture  and poor body mechanics  Understanding of Dx/Px/POC: good   Prognosis: good    Goals  Short Term Goals (4-6 weeks)  Pt will be independent in basic Home Exercise Program -MET  Pt will demonstrate improved postural awareness with no cueing required from PT-MET  Pt will demonstrate improved left shoulder PROM TO 90 deg flex and abd, and ER to 45 deg- MET  Pt will report a decrease in resting pain no more than 2/10 -MET    Long Term Goals (8-10 weeks)  Pt will be independent in comprehensive Home Exercise Program -NOT MET  Pt will be able to reach in a cabinet at shoulder height-MET  Pt will be independent in self care and basic ADLs -MET  Pt will demonstrate improved strength in shoulder to at least 4/5- Partially MET      Plan  Patient would benefit from: skilled PT  Referral necessary: No  Planned modality interventions: cryotherapy and thermotherapy: hydrocollator packs  Planned therapy interventions: ADL retraining, body mechanics training, functional ROM exercises, home exercise program, graded exercise, joint mobilization, manual therapy, massage, neuromuscular re-education, patient education, postural training, strengthening, stretching, therapeutic activities, therapeutic exercise and therapeutic training  Frequency: 1x week  Duration in weeks: 8  Treatment plan discussed with: patient        Subjective Evaluation    History of Present Illness  Date of surgery: 2018  Mechanism of injury: Pt reports he feels like he's at 70%  Complains of increased stiffness in shoulder first thing in the morning and with colder weather  Pt states that he is able to use shoulder for work duties such as cutting things and carrying things around 25 lbs  He is able to to reach for things in overhead cabinets  Reports compliance with daily stretching  Pt is leaving for [place] this week and will be away for 6 weeks  Continues with numbness and tingling in left hand occasionally throughout the day and with sleeping  Waking up 1-2 times a night now which has improved     Pain  No pain reported  Current pain ratin  At best pain ratin  At worst pain ratin  Location: left shoulder  Quality: tight and dull ache  Relieving factors: ice and medications  Aggravating factors: overhead activity and lifting  Progression: improved    Social Support    Employment status: working (AC and heating, lifting)  Hand dominance: left      Diagnostic Tests  X-ray: abnormal  MRI studies: abnormal  Treatments  Previous treatment: medication and physical therapy  Current treatment: physical therapy  Patient Goals  Patient goals for therapy: decreased pain, decreased edema, increased motion, increased strength, independence with ADLs/IADLs and return to work (ONGOING)          Objective     Postural Observations  Seated posture: good  Standing posture: fair  Correction of posture: makes symptoms better        Observations   Left Shoulder   Positive for edema (left hand), incision and spasms  Additional Observation Details  Incision left anterior shoulder clean, dry, intact  Minimal scar tissue present    Palpation   Left   No palpable tenderness to the biceps, middle deltoid and upper trapezius  Hypertonic in the pectoralis minor  Tenderness of the pectoralis minor  Tenderness     Left Shoulder   Tenderness in the acromion       Right Shoulder  No tenderness     Cervical/Thoracic Screen   Cervical range of motion within normal limits  Thoracic range of motion within normal limits with the following exceptions: Into extension    Neurological Testing     Sensation     Shoulder   Left Shoulder   Intact: light touch    Right Shoulder   Intact: light touch    Active Range of Motion   Left Shoulder   Flexion: 140 degrees   Abduction: 110 degrees   External rotation 0°: 70 degrees   Internal rotation 45°: 55 degrees     Right Shoulder   Flexion: 160 degrees   Abduction: 145 degrees   External rotation 45°: 60 degrees   Internal rotation 45°: 80 degrees     Additional Active Range of Motion Details  Elbow AROM right WNL, left 0-135 deg  Moderate restrictions in left wrist and finger extension and flexion AROM secondary to swelling    Scapular Mobility   Left Shoulder   Scapular mobility: poor    Strength/Myotome Testing     Left Shoulder     Planes of Motion   Flexion: 4   Abduction: 4   External rotation at 45°: 4   Internal rotation at 45°: 4     Right Shoulder     Planes of Motion   Flexion: 4+   Abduction: 4+   External rotation at 45°: 4+   Internal rotation at 45°: 4+       Flowsheet Rows      Most Recent Value   PT/OT G-Codes   Current Score  55   Projected Score  59   FOTO information reviewed  Yes   Assessment Type Re-evaluation   G code set  Carrying, Moving & Handling Objects   Carrying, Moving and Handling Objects Current Status ()  CK   Carrying, Moving and Handling Objects Goal Status ()  CK          [employer] access code: [email or web address] updated today    Precautions TSA rehab protocol    Specialty Daily Treatment Diary     Manual  1/25 1/4 1/11 1/18   PROM shoulder flex, abd and ER and elbow flex and ext 2x10 ea  2x10 ea 2x10 ea 2x10 ea   STM        Supination stretch 10 sec, 1x10  10 sec, 1x10 10 sec, 1x10 10 sec, 1x10   PROM   Left wrist and fingers Left wrist and fingers Left wrist and fingers   Median nerve glides 2x10  2x10 2x10 2x10       Exercise Diary         scaption 2x10  2x10 2x10 2x10   AAROM supine flexion         Row        Cane ER Hold 5, 2x10  Hold 5, 2x10 Hold 5, 2x10 Hold 5, 2x10   Cane abduction Hold 5, 2x10   Hold 5, 2x10 Hold 5, 2x10 Hold 5, 2x10   Supine flexion     2 lbs, 1x15   Cane flexion Hold 5, 2x10   Hold 5, 2x10 Hold 5, 2x10 Hold 5, 2x10   Tband ER Bilateral yellow, 2x10       Tband ext Yellow, Hold 5, 2x10        Tband row Blue, Hold 5, 2x10        pulleys Flexion, 3 min  Flexion, 3 min Flexion 3 min Flexion, 3 min   Functional Reaching to Cabinet   1 lb, 1x15 1 lb, 1x15 1 lb, 1x15   Flex Thera-bar     [place]/backward turning 2x30 sec ea Forward/backward turning 2x30 sec ea   Prayer stretch Hold 10, 1x10  Hold 10, 1x10 Hold 10, 1x10 Hold 10, 1x10       Modalities        CP

## 2019-03-16 NOTE — PROGRESS NOTES
Assessment/Plan:  1  Shoulder arthritis         The patient is making slow progress status post replacement  He will continue physical therapy once a week  We discussed the importance of been doing home exercises, as he has not been Jain with these as of recently  We will see him back in 6 weeks for repeat evaluation  Subjective:   Winston Paul is a 60 y o  male who presents today for follow-up of his left shoulder, now over 3 months status post total shoulder replacement  He is doing physical therapy once a week now  He notes slow progress  He still does note moderate discomfort about the shoulder when the weather is bad  His pain is mostly at night when trying to sleep  He states that he is moving around during the day he has little discomfort  He notes improving range of motion strength  Review of Systems   Constitutional: Negative for chills, fever and unexpected weight change  HENT: Negative for hearing loss, nosebleeds and sore throat  Eyes: Negative for pain, redness and visual disturbance  Respiratory: Negative for cough, shortness of breath and wheezing  Cardiovascular: Negative for chest pain, palpitations and leg swelling  Gastrointestinal: Negative for abdominal pain, nausea and vomiting  Endocrine: Negative for polyphagia and polyuria  Genitourinary: Negative for dysuria and hematuria  Musculoskeletal:        See HPI   Skin: Negative for rash and wound  Neurological: Negative for dizziness, numbness and headaches  Psychiatric/Behavioral: Negative for decreased concentration and suicidal ideas  The patient is not nervous/anxious            Past Medical History:   Diagnosis Date    Arthritis     Diabetes ([address] )     Tremor     on left arm - told due to shoulder problem       Past Surgical History:   Procedure Laterality Date    COLONOSCOPY      CYST REMOVAL      [place] RECONSTR TOTAL SHOULDER IMPLANT Left 9/6/2018    Procedure: TOTAL SHOULDER ARTHROPLASTY;  Surgeon: Return with any fevers  Spreading of redness    Any nausea or vomiting Jose Miguel Linares MD;  Location: [facility];  Service: Orthopedics    UNDESCENDED TESTICLE EXPLORATION         History reviewed  No pertinent family history  Social History     Occupational History    Not on file  Social History Main Topics    Smoking status: Never Smoker    Smokeless tobacco: Never Used    Alcohol use Yes      Comment: rarely    Drug use: No    Sexual activity: Not on file         Current Outpatient Prescriptions:     lisinopril (ZESTRIL) 10 mg tablet, 10 mg every evening  , Disp: , Rfl: 1    metFORMIN (GLUCOPHAGE-XR) 750 mg 24 hr tablet, 750 mg daily with breakfast  , Disp: , Rfl: 5    Allergies   Allergen Reactions    Other      Cats       Objective:  Vitals:    12/04/18 1109   BP: 131/80   Pulse: 71       Left Shoulder Exam     Tenderness   The patient is experiencing no tenderness  Range of Motion   Active Abduction: 70   Forward Flexion: 100   External Rotation: 20   Internal Rotation 0 degrees: Sacrum     Tests   Drop Arm: negative    Other   Erythema: absent  Scars: present  Sensation: normal  Pulse: present             Physical Exam   Constitutional: He is oriented to person, place, and time  He appears well-developed and well-nourished  No distress  HENT:   Head: Normocephalic and atraumatic  Eyes: Conjunctivae and EOM are normal  No scleral icterus  Neck: No JVD present  Cardiovascular: Normal rate and intact distal pulses  Pulmonary/Chest: Effort normal  No respiratory distress  Abdominal: Soft  He exhibits no distension  Neurological: He is alert and oriented to person, place, and time  Coordination normal    Skin: Skin is warm  Psychiatric: He has a normal mood and affect

## 2019-11-25 ENCOUNTER — NEW PATIENT (OUTPATIENT)
Dept: URBAN - METROPOLITAN AREA CLINIC 27 | Facility: CLINIC | Age: 64
End: 2019-11-25

## 2019-11-25 DIAGNOSIS — H25.13: ICD-10-CM

## 2019-11-25 DIAGNOSIS — E11.3313: ICD-10-CM

## 2019-11-25 PROCEDURE — 92235 FLUORESCEIN ANGRPH MLTIFRAME: CPT

## 2019-11-25 PROCEDURE — 92250 FUNDUS PHOTOGRAPHY W/I&R: CPT

## 2019-11-25 PROCEDURE — 92225 OPHTHALMOSCOPY (INITIAL): CPT

## 2019-11-25 PROCEDURE — 92134 CPTRZ OPH DX IMG PST SGM RTA: CPT

## 2019-11-25 PROCEDURE — 99204 OFFICE O/P NEW MOD 45 MIN: CPT

## 2019-11-25 ASSESSMENT — VISUAL ACUITY
OS_CC: 20/30-1
OD_CC: 20/30-1

## 2019-11-25 ASSESSMENT — TONOMETRY
OD_IOP_MMHG: 28
OS_IOP_MMHG: 27

## 2019-12-09 ENCOUNTER — PROCEDURE ONLY (OUTPATIENT)
Dept: URBAN - METROPOLITAN AREA CLINIC 27 | Facility: CLINIC | Age: 64
End: 2019-12-09

## 2019-12-09 DIAGNOSIS — E11.3313: ICD-10-CM

## 2019-12-09 PROCEDURE — 67210 TREATMENT OF RETINAL LESION: CPT

## 2019-12-09 ASSESSMENT — TONOMETRY: OS_IOP_MMHG: 16

## 2019-12-09 ASSESSMENT — VISUAL ACUITY: OS_CC: 20/40

## 2019-12-30 ENCOUNTER — PROCEDURE ONLY (OUTPATIENT)
Dept: URBAN - METROPOLITAN AREA CLINIC 27 | Facility: CLINIC | Age: 64
End: 2019-12-30

## 2019-12-30 DIAGNOSIS — E11.3313: ICD-10-CM

## 2019-12-30 PROCEDURE — 67210 TREATMENT OF RETINAL LESION: CPT

## 2019-12-30 ASSESSMENT — TONOMETRY: OD_IOP_MMHG: 16

## 2019-12-30 ASSESSMENT — VISUAL ACUITY: OD_CC: 20/40+2

## 2020-03-30 ENCOUNTER — FOLLOW UP (OUTPATIENT)
Dept: URBAN - METROPOLITAN AREA CLINIC 27 | Facility: CLINIC | Age: 65
End: 2020-03-30

## 2020-03-30 DIAGNOSIS — H25.13: ICD-10-CM

## 2020-03-30 DIAGNOSIS — E11.3313: ICD-10-CM

## 2020-03-30 PROCEDURE — 92202 OPSCPY EXTND ON/MAC DRAW: CPT

## 2020-03-30 PROCEDURE — 92014 COMPRE OPH EXAM EST PT 1/>: CPT

## 2020-03-30 PROCEDURE — 92134 CPTRZ OPH DX IMG PST SGM RTA: CPT

## 2020-03-30 PROCEDURE — 92235 FLUORESCEIN ANGRPH MLTIFRAME: CPT

## 2020-03-30 PROCEDURE — 92250 FUNDUS PHOTOGRAPHY W/I&R: CPT

## 2020-03-30 ASSESSMENT — VISUAL ACUITY
OD_CC: 20/30-2
OS_CC: 20/40-1

## 2020-03-30 ASSESSMENT — TONOMETRY
OS_IOP_MMHG: 20
OD_IOP_MMHG: 23/20

## 2020-06-29 ENCOUNTER — 3 MONTH EXAM (OUTPATIENT)
Dept: URBAN - METROPOLITAN AREA CLINIC 27 | Facility: CLINIC | Age: 65
End: 2020-06-29

## 2020-06-29 DIAGNOSIS — H25.13: ICD-10-CM

## 2020-06-29 DIAGNOSIS — E11.3313: ICD-10-CM

## 2020-06-29 PROCEDURE — 92235 FLUORESCEIN ANGRPH MLTIFRAME: CPT

## 2020-06-29 PROCEDURE — 92250 FUNDUS PHOTOGRAPHY W/I&R: CPT

## 2020-06-29 PROCEDURE — 92202 OPSCPY EXTND ON/MAC DRAW: CPT

## 2020-06-29 PROCEDURE — 92134 CPTRZ OPH DX IMG PST SGM RTA: CPT

## 2020-06-29 PROCEDURE — 92014 COMPRE OPH EXAM EST PT 1/>: CPT

## 2020-06-29 ASSESSMENT — TONOMETRY
OD_IOP_MMHG: 19
OS_IOP_MMHG: 19

## 2020-06-29 ASSESSMENT — VISUAL ACUITY
OD_CC: 20/30
OS_CC: 20/40

## 2020-10-26 ENCOUNTER — 3 MONTH EXAM (OUTPATIENT)
Dept: URBAN - METROPOLITAN AREA CLINIC 27 | Facility: CLINIC | Age: 65
End: 2020-10-26

## 2020-10-26 DIAGNOSIS — E11.3313: ICD-10-CM

## 2020-10-26 DIAGNOSIS — H25.13: ICD-10-CM

## 2020-10-26 PROCEDURE — 92134 CPTRZ OPH DX IMG PST SGM RTA: CPT

## 2020-10-26 PROCEDURE — 92014 COMPRE OPH EXAM EST PT 1/>: CPT

## 2020-10-26 ASSESSMENT — TONOMETRY
OD_IOP_MMHG: 16
OS_IOP_MMHG: 13

## 2020-10-26 ASSESSMENT — VISUAL ACUITY
OS_CC: 20/50
OD_CC: 20/30-2

## 2021-01-25 ENCOUNTER — FOLLOW UP (OUTPATIENT)
Dept: URBAN - METROPOLITAN AREA CLINIC 27 | Facility: CLINIC | Age: 66
End: 2021-01-25

## 2021-01-25 DIAGNOSIS — H25.13: ICD-10-CM

## 2021-01-25 DIAGNOSIS — E11.3313: ICD-10-CM

## 2021-01-25 PROCEDURE — 92250 FUNDUS PHOTOGRAPHY W/I&R: CPT

## 2021-01-25 PROCEDURE — 92014 COMPRE OPH EXAM EST PT 1/>: CPT

## 2021-01-25 PROCEDURE — 92134 CPTRZ OPH DX IMG PST SGM RTA: CPT

## 2021-01-25 ASSESSMENT — TONOMETRY
OD_IOP_MMHG: 14
OS_IOP_MMHG: 16

## 2021-01-25 ASSESSMENT — VISUAL ACUITY
OD_CC: 20/30
OS_CC: 20/40-2

## 2021-05-17 ENCOUNTER — FOLLOW UP (OUTPATIENT)
Dept: URBAN - METROPOLITAN AREA CLINIC 27 | Facility: CLINIC | Age: 66
End: 2021-05-17

## 2021-05-17 DIAGNOSIS — E11.3313: ICD-10-CM

## 2021-05-17 DIAGNOSIS — H25.13: ICD-10-CM

## 2021-05-17 PROCEDURE — 92250 FUNDUS PHOTOGRAPHY W/I&R: CPT

## 2021-05-17 PROCEDURE — 92235 FLUORESCEIN ANGRPH MLTIFRAME: CPT

## 2021-05-17 PROCEDURE — 92014 COMPRE OPH EXAM EST PT 1/>: CPT | Mod: 25

## 2021-05-17 PROCEDURE — 92134 CPTRZ OPH DX IMG PST SGM RTA: CPT

## 2021-05-17 PROCEDURE — 92202 OPSCPY EXTND ON/MAC DRAW: CPT

## 2021-05-17 ASSESSMENT — VISUAL ACUITY
OS_CC: 20/50+1
OD_CC: 20/25-1

## 2021-05-17 ASSESSMENT — TONOMETRY
OD_IOP_MMHG: 14
OS_IOP_MMHG: 12

## 2021-09-20 ENCOUNTER — FOLLOW UP (OUTPATIENT)
Dept: URBAN - METROPOLITAN AREA CLINIC 27 | Facility: CLINIC | Age: 66
End: 2021-09-20

## 2021-09-20 DIAGNOSIS — H25.13: ICD-10-CM

## 2021-09-20 DIAGNOSIS — E11.3313: ICD-10-CM

## 2021-09-20 PROCEDURE — 92202 OPSCPY EXTND ON/MAC DRAW: CPT

## 2021-09-20 PROCEDURE — 92014 COMPRE OPH EXAM EST PT 1/>: CPT

## 2021-09-20 PROCEDURE — 92134 CPTRZ OPH DX IMG PST SGM RTA: CPT

## 2021-09-20 ASSESSMENT — TONOMETRY
OD_IOP_MMHG: 18
OS_IOP_MMHG: 17

## 2021-09-20 ASSESSMENT — VISUAL ACUITY
OS_PH: 20/30-1
OS_CC: 20/40
OD_CC: 20/25-1

## 2022-01-17 ENCOUNTER — FOLLOW UP (OUTPATIENT)
Dept: URBAN - METROPOLITAN AREA CLINIC 27 | Facility: CLINIC | Age: 67
End: 2022-01-17

## 2022-01-17 DIAGNOSIS — H25.13: ICD-10-CM

## 2022-01-17 DIAGNOSIS — E11.3313: ICD-10-CM

## 2022-01-17 PROCEDURE — 92134 CPTRZ OPH DX IMG PST SGM RTA: CPT

## 2022-01-17 PROCEDURE — 92014 COMPRE OPH EXAM EST PT 1/>: CPT

## 2022-01-17 PROCEDURE — 92202 OPSCPY EXTND ON/MAC DRAW: CPT

## 2022-01-17 ASSESSMENT — TONOMETRY
OD_IOP_MMHG: 11
OS_IOP_MMHG: 17

## 2022-01-17 ASSESSMENT — VISUAL ACUITY
OD_CC: 20/25-2
OS_CC: 20/40-2

## 2022-04-11 ENCOUNTER — FOLLOW UP (OUTPATIENT)
Dept: URBAN - METROPOLITAN AREA CLINIC 27 | Facility: CLINIC | Age: 67
End: 2022-04-11

## 2022-04-11 DIAGNOSIS — H25.13: ICD-10-CM

## 2022-04-11 DIAGNOSIS — E11.3313: ICD-10-CM

## 2022-04-11 PROCEDURE — 92134 CPTRZ OPH DX IMG PST SGM RTA: CPT

## 2022-04-11 PROCEDURE — 92014 COMPRE OPH EXAM EST PT 1/>: CPT

## 2022-04-11 PROCEDURE — 92202 OPSCPY EXTND ON/MAC DRAW: CPT

## 2022-04-11 ASSESSMENT — TONOMETRY
OS_IOP_MMHG: 13
OD_IOP_MMHG: 13

## 2022-04-11 ASSESSMENT — VISUAL ACUITY
OS_CC: 20/40
OD_CC: 20/30-1

## 2022-07-18 ENCOUNTER — 3 MONTH EXAM (OUTPATIENT)
Dept: URBAN - METROPOLITAN AREA CLINIC 27 | Facility: CLINIC | Age: 67
End: 2022-07-18

## 2022-07-18 DIAGNOSIS — E11.3313: ICD-10-CM

## 2022-07-18 DIAGNOSIS — H25.13: ICD-10-CM

## 2022-07-18 PROCEDURE — 92014 COMPRE OPH EXAM EST PT 1/>: CPT

## 2022-07-18 PROCEDURE — 92202 OPSCPY EXTND ON/MAC DRAW: CPT

## 2022-07-18 PROCEDURE — 92134 CPTRZ OPH DX IMG PST SGM RTA: CPT

## 2022-07-18 ASSESSMENT — TONOMETRY
OS_IOP_MMHG: 21
OD_IOP_MMHG: 17

## 2022-07-18 ASSESSMENT — VISUAL ACUITY
OS_CC: 20/40-1
OD_CC: 20/25-2

## 2022-10-12 ENCOUNTER — TELEPHONE (OUTPATIENT)
Dept: OBGYN CLINIC | Facility: CLINIC | Age: 67
End: 2022-10-12

## 2022-10-12 NOTE — TELEPHONE ENCOUNTER
Dental office contacted Dr Branch's office to se if patient required pre medication due to his TSA  Per Dr Mary Dooley protocol his patient's do not require pre medication after total shoulder replacement  Letter placed and faxed per dental office request [phone]

## 2022-10-17 ENCOUNTER — 3 MONTH EXAM (OUTPATIENT)
Dept: URBAN - METROPOLITAN AREA CLINIC 27 | Facility: CLINIC | Age: 67
End: 2022-10-17

## 2022-10-17 DIAGNOSIS — H25.13: ICD-10-CM

## 2022-10-17 DIAGNOSIS — E11.3313: ICD-10-CM

## 2022-10-17 PROCEDURE — 92014 COMPRE OPH EXAM EST PT 1/>: CPT

## 2022-10-17 PROCEDURE — 92134 CPTRZ OPH DX IMG PST SGM RTA: CPT

## 2022-10-17 PROCEDURE — 92202 OPSCPY EXTND ON/MAC DRAW: CPT

## 2022-10-17 ASSESSMENT — TONOMETRY
OS_IOP_MMHG: 14
OD_IOP_MMHG: 16

## 2022-10-17 ASSESSMENT — VISUAL ACUITY
OS_CC: 20/40-2
OD_CC: 20/25+1

## 2023-01-09 ENCOUNTER — 3 MONTH EXAM (OUTPATIENT)
Dept: URBAN - METROPOLITAN AREA CLINIC 27 | Facility: CLINIC | Age: 68
End: 2023-01-09

## 2023-01-09 DIAGNOSIS — H25.13: ICD-10-CM

## 2023-01-09 DIAGNOSIS — E11.3313: ICD-10-CM

## 2023-01-09 PROCEDURE — 92134 CPTRZ OPH DX IMG PST SGM RTA: CPT

## 2023-01-09 PROCEDURE — 92014 COMPRE OPH EXAM EST PT 1/>: CPT

## 2023-01-09 PROCEDURE — 92202 OPSCPY EXTND ON/MAC DRAW: CPT

## 2023-01-09 ASSESSMENT — VISUAL ACUITY
OD_CC: 20/25+1
OS_PH: 20/30-2
OS_CC: 20/50+2

## 2023-01-09 ASSESSMENT — TONOMETRY
OS_IOP_MMHG: 9
OD_IOP_MMHG: 20

## 2023-05-08 ENCOUNTER — FOLLOW UP (OUTPATIENT)
Dept: URBAN - METROPOLITAN AREA CLINIC 27 | Facility: CLINIC | Age: 68
End: 2023-05-08

## 2023-05-08 DIAGNOSIS — H25.11: ICD-10-CM

## 2023-05-08 DIAGNOSIS — E11.3313: ICD-10-CM

## 2023-05-08 PROCEDURE — 92202 OPSCPY EXTND ON/MAC DRAW: CPT

## 2023-05-08 PROCEDURE — 92134 CPTRZ OPH DX IMG PST SGM RTA: CPT

## 2023-05-08 PROCEDURE — 92014 COMPRE OPH EXAM EST PT 1/>: CPT

## 2023-05-08 ASSESSMENT — VISUAL ACUITY
OD_PH: 20/30+1
OD_SC: 20/50-2
OS_SC: 20/25

## 2023-05-08 ASSESSMENT — TONOMETRY
OS_IOP_MMHG: 19
OD_IOP_MMHG: 17

## 2023-08-21 ENCOUNTER — FOLLOW UP (OUTPATIENT)
Dept: URBAN - METROPOLITAN AREA CLINIC 27 | Facility: CLINIC | Age: 68
End: 2023-08-21

## 2023-08-21 DIAGNOSIS — H43.822: ICD-10-CM

## 2023-08-21 DIAGNOSIS — E11.3313: ICD-10-CM

## 2023-08-21 PROCEDURE — 92014 COMPRE OPH EXAM EST PT 1/>: CPT

## 2023-08-21 PROCEDURE — 92134 CPTRZ OPH DX IMG PST SGM RTA: CPT

## 2023-08-21 ASSESSMENT — VISUAL ACUITY
OS_CC: 20/25+1
OD_CC: 20/25-2

## 2023-08-21 ASSESSMENT — TONOMETRY
OD_IOP_MMHG: 15
OS_IOP_MMHG: 12

## 2023-12-11 ENCOUNTER — FOLLOW UP (OUTPATIENT)
Dept: URBAN - METROPOLITAN AREA CLINIC 27 | Facility: CLINIC | Age: 68
End: 2023-12-11

## 2023-12-11 DIAGNOSIS — E11.3313: ICD-10-CM

## 2023-12-11 DIAGNOSIS — H43.822: ICD-10-CM

## 2023-12-11 PROCEDURE — 92014 COMPRE OPH EXAM EST PT 1/>: CPT

## 2023-12-11 PROCEDURE — 92202 OPSCPY EXTND ON/MAC DRAW: CPT

## 2023-12-11 PROCEDURE — 92134 CPTRZ OPH DX IMG PST SGM RTA: CPT

## 2023-12-11 ASSESSMENT — TONOMETRY
OS_IOP_MMHG: 14
OD_IOP_MMHG: 14

## 2023-12-11 ASSESSMENT — VISUAL ACUITY
OS_SC: 20/30-1
OD_SC: 20/25-2

## 2024-04-08 ENCOUNTER — FOLLOW UP (OUTPATIENT)
Dept: URBAN - METROPOLITAN AREA CLINIC 27 | Facility: CLINIC | Age: 69
End: 2024-04-08

## 2024-04-08 DIAGNOSIS — H43.822: ICD-10-CM

## 2024-04-08 DIAGNOSIS — E11.3411: ICD-10-CM

## 2024-04-08 DIAGNOSIS — E11.3512: ICD-10-CM

## 2024-04-08 PROCEDURE — 92014 COMPRE OPH EXAM EST PT 1/>: CPT

## 2024-04-08 PROCEDURE — 92134 CPTRZ OPH DX IMG PST SGM RTA: CPT

## 2024-04-08 PROCEDURE — 92201 OPSCPY EXTND RTA DRAW UNI/BI: CPT

## 2024-04-08 ASSESSMENT — VISUAL ACUITY
OD_SC: 20/20-2
OS_SC: 20/40-1

## 2024-04-08 ASSESSMENT — TONOMETRY
OD_IOP_MMHG: 10
OS_IOP_MMHG: 12

## 2024-04-15 ENCOUNTER — PROCEDURE ONLY (OUTPATIENT)
Dept: URBAN - METROPOLITAN AREA CLINIC 27 | Facility: CLINIC | Age: 69
End: 2024-04-15

## 2024-04-15 DIAGNOSIS — E11.3512: ICD-10-CM

## 2024-04-15 PROCEDURE — 67028 INJECTION EYE DRUG: CPT

## 2024-04-15 ASSESSMENT — VISUAL ACUITY: OS_CC: 20/40-2

## 2024-04-15 ASSESSMENT — TONOMETRY: OS_IOP_MMHG: 18

## 2024-05-20 ENCOUNTER — FOLLOW UP (OUTPATIENT)
Dept: URBAN - METROPOLITAN AREA CLINIC 27 | Facility: CLINIC | Age: 69
End: 2024-05-20

## 2024-05-20 DIAGNOSIS — E11.3512: ICD-10-CM

## 2024-05-20 DIAGNOSIS — E11.3411: ICD-10-CM

## 2024-05-20 PROCEDURE — 67028 INJECTION EYE DRUG: CPT

## 2024-05-20 PROCEDURE — 92014 COMPRE OPH EXAM EST PT 1/>: CPT | Mod: 25

## 2024-05-20 PROCEDURE — 92134 CPTRZ OPH DX IMG PST SGM RTA: CPT

## 2024-05-20 PROCEDURE — 92202 OPSCPY EXTND ON/MAC DRAW: CPT | Mod: NC

## 2024-05-20 ASSESSMENT — VISUAL ACUITY
OD_SC: 20/30+2
OS_SC: 20/30+1
OD_PH: 20/25-1

## 2024-05-20 ASSESSMENT — TONOMETRY
OS_IOP_MMHG: 13
OD_IOP_MMHG: 13

## 2024-07-01 ENCOUNTER — FOLLOW UP (OUTPATIENT)
Dept: URBAN - METROPOLITAN AREA CLINIC 27 | Facility: CLINIC | Age: 69
End: 2024-07-01

## 2024-07-01 DIAGNOSIS — E11.3411: ICD-10-CM

## 2024-07-01 DIAGNOSIS — E11.3512: ICD-10-CM

## 2024-07-01 PROCEDURE — 92134 CPTRZ OPH DX IMG PST SGM RTA: CPT

## 2024-07-01 PROCEDURE — 92202 OPSCPY EXTND ON/MAC DRAW: CPT | Mod: 59

## 2024-07-01 PROCEDURE — 67028 INJECTION EYE DRUG: CPT

## 2024-07-01 PROCEDURE — 92014 COMPRE OPH EXAM EST PT 1/>: CPT | Mod: 25

## 2024-07-01 ASSESSMENT — VISUAL ACUITY
OS_SC: 20/30
OD_SC: 20/30+2

## 2024-07-01 ASSESSMENT — TONOMETRY
OS_IOP_MMHG: 18
OD_IOP_MMHG: 14

## 2024-08-19 ENCOUNTER — FOLLOW UP (OUTPATIENT)
Dept: URBAN - METROPOLITAN AREA CLINIC 27 | Facility: CLINIC | Age: 69
End: 2024-08-19

## 2024-08-19 DIAGNOSIS — E11.3411: ICD-10-CM

## 2024-08-19 DIAGNOSIS — E11.3512: ICD-10-CM

## 2024-08-19 DIAGNOSIS — H43.822: ICD-10-CM

## 2024-08-19 PROCEDURE — 92202 OPSCPY EXTND ON/MAC DRAW: CPT | Mod: 59

## 2024-08-19 PROCEDURE — 92134 CPTRZ OPH DX IMG PST SGM RTA: CPT

## 2024-08-19 PROCEDURE — 67028 INJECTION EYE DRUG: CPT

## 2024-08-19 PROCEDURE — 92014 COMPRE OPH EXAM EST PT 1/>: CPT | Mod: 25

## 2024-08-19 ASSESSMENT — VISUAL ACUITY
OD_PH: 20/30+2
OS_SC: 20/30-1
OD_SC: 20/30-1

## 2024-08-19 ASSESSMENT — TONOMETRY
OS_IOP_MMHG: 8
OD_IOP_MMHG: 9

## 2024-11-04 ENCOUNTER — FOLLOW UP (OUTPATIENT)
Dept: URBAN - METROPOLITAN AREA CLINIC 27 | Facility: CLINIC | Age: 69
End: 2024-11-04

## 2024-11-04 DIAGNOSIS — H43.822: ICD-10-CM

## 2024-11-04 DIAGNOSIS — E11.3411: ICD-10-CM

## 2024-11-04 DIAGNOSIS — E11.3512: ICD-10-CM

## 2024-11-04 PROCEDURE — 92134 CPTRZ OPH DX IMG PST SGM RTA: CPT

## 2024-11-04 PROCEDURE — 92202 OPSCPY EXTND ON/MAC DRAW: CPT | Mod: NC

## 2024-11-04 PROCEDURE — 67028 INJECTION EYE DRUG: CPT

## 2024-11-04 PROCEDURE — 92014 COMPRE OPH EXAM EST PT 1/>: CPT | Mod: 25

## 2024-11-04 ASSESSMENT — VISUAL ACUITY
OD_SC: 20/25+1
OS_SC: 20/25-2

## 2024-11-04 ASSESSMENT — TONOMETRY
OD_IOP_MMHG: 13
OS_IOP_MMHG: 15

## 2025-01-24 ENCOUNTER — FOLLOW UP (OUTPATIENT)
Dept: URBAN - METROPOLITAN AREA CLINIC 27 | Facility: CLINIC | Age: 70
End: 2025-01-24

## 2025-01-24 DIAGNOSIS — H43.822: ICD-10-CM

## 2025-01-24 DIAGNOSIS — E11.3512: ICD-10-CM

## 2025-01-24 DIAGNOSIS — E11.3411: ICD-10-CM

## 2025-01-24 PROCEDURE — 67028 INJECTION EYE DRUG: CPT

## 2025-01-24 PROCEDURE — 92202 OPSCPY EXTND ON/MAC DRAW: CPT | Mod: 59

## 2025-01-24 PROCEDURE — 92014 COMPRE OPH EXAM EST PT 1/>: CPT | Mod: 25

## 2025-01-24 PROCEDURE — 92134 CPTRZ OPH DX IMG PST SGM RTA: CPT

## 2025-01-24 ASSESSMENT — TONOMETRY
OS_IOP_MMHG: 20
OD_IOP_MMHG: 18

## 2025-01-24 ASSESSMENT — VISUAL ACUITY
OD_SC: 20/25-2
OS_SC: 20/25+1

## (undated) DEVICE — HANDPIECE SET WITH HIGH FLOW TIP AND SUCTION TUBE: Brand: INTERPULSE

## (undated) DEVICE — GROUNDING PAD UNIVERSAL SLW

## (undated) DEVICE — 60 ML SYRINGE,REGULAR TIP: Brand: MONOJECT

## (undated) DEVICE — 3M™ STERI-DRAPE™ U-DRAPE 1015: Brand: STERI-DRAPE™

## (undated) DEVICE — SCD SEQUENTIAL COMPRESSION COMFORT SLEEVE MEDIUM KNEE LENGTH: Brand: KENDALL SCD

## (undated) DEVICE — POSITIONER TRIMANO LIMB BEACH CHAIR

## (undated) DEVICE — ORTHOPEDIC PACK: Brand: CARDINAL HEALTH

## (undated) DEVICE — REPEL CUT REST SURGICAL GLV LNRS LG: Brand: REPEL

## (undated) DEVICE — CAPIT SHOULDER TOTAL UNITE

## (undated) DEVICE — ANTIBACTERIAL UNDYED BRAIDED (POLYGLACTIN 910), SYNTHETIC ABSORBABLE SUTURE: Brand: COATED VICRYL

## (undated) DEVICE — 3M™ TEGADERM™ TRANSPARENT FILM DRESSING FRAME STYLE, 1626W, 4 IN X 4-3/4 IN (10 CM X 12 CM), 50/CT 4CT/CASE: Brand: 3M™ TEGADERM™

## (undated) DEVICE — GLOVE SRG BIOGEL 8

## (undated) DEVICE — SUT ETHIBOND 2 V-37 30 IN MX69G

## (undated) DEVICE — ADHESIVE SKN CLSR HISTOACRYL FLEX 0.5ML LF

## (undated) DEVICE — DUAL CUT SAGITTAL BLADE

## (undated) DEVICE — SUT VICRYL 4-0 PS-2 18 IN J496G

## (undated) DEVICE — GLOVE INDICATOR PI UNDERGLOVE SZ 7.5 BLUE

## (undated) DEVICE — DRESSING MEPILEX AG BORDER 4 X 8 IN

## (undated) DEVICE — CHLORAPREP HI-LITE 26ML ORANGE

## (undated) DEVICE — ANTIBACTERIAL VIOLET BRAIDED (POLYGLACTIN 910), SYNTHETIC ABSORBABLE SUTURE: Brand: COATED VICRYL

## (undated) DEVICE — HOOD: Brand: FLYTE, SURGICOOL

## (undated) DEVICE — TIBURON SPLIT SHEET: Brand: CONVERTORS

## (undated) DEVICE — ANTIBACTERIAL VIOLET BRAIDED (POLYGLACTIN 910), SYNTHETIC ABSORBABLE SURGICAL SUTURE: Brand: COATED VICRYL

## (undated) DEVICE — DRESSING MEPILEX BORDER 4 X 8 IN

## (undated) DEVICE — ASTOUND STANDARD SURGICAL GOWN, XL: Brand: CONVERTORS